# Patient Record
Sex: FEMALE | Race: WHITE | NOT HISPANIC OR LATINO | Employment: UNEMPLOYED | URBAN - METROPOLITAN AREA
[De-identification: names, ages, dates, MRNs, and addresses within clinical notes are randomized per-mention and may not be internally consistent; named-entity substitution may affect disease eponyms.]

---

## 2018-08-29 ENCOUNTER — OFFICE VISIT (OUTPATIENT)
Dept: MEDICAL GROUP | Facility: LAB | Age: 28
End: 2018-08-29
Payer: COMMERCIAL

## 2018-08-29 VITALS
DIASTOLIC BLOOD PRESSURE: 56 MMHG | OXYGEN SATURATION: 98 % | RESPIRATION RATE: 12 BRPM | SYSTOLIC BLOOD PRESSURE: 98 MMHG | HEIGHT: 67 IN | BODY MASS INDEX: 17.58 KG/M2 | WEIGHT: 112 LBS | TEMPERATURE: 98.5 F | HEART RATE: 68 BPM

## 2018-08-29 DIAGNOSIS — Z00.00 HEALTH MAINTENANCE EXAMINATION: ICD-10-CM

## 2018-08-29 DIAGNOSIS — R09.81 NASAL CONGESTION: ICD-10-CM

## 2018-08-29 DIAGNOSIS — J00 ACUTE NASOPHARYNGITIS: ICD-10-CM

## 2018-08-29 DIAGNOSIS — Z91.148 OVERUSE OF MEDICATION: ICD-10-CM

## 2018-08-29 PROCEDURE — 99214 OFFICE O/P EST MOD 30 MIN: CPT | Performed by: FAMILY MEDICINE

## 2018-08-29 RX ORDER — FLUTICASONE PROPIONATE 50 MCG
1 SPRAY, SUSPENSION (ML) NASAL 2 TIMES DAILY
Qty: 1 BOTTLE | Refills: 0 | Status: SHIPPED | OUTPATIENT
Start: 2018-08-29 | End: 2020-08-17

## 2018-08-29 ASSESSMENT — PATIENT HEALTH QUESTIONNAIRE - PHQ9: CLINICAL INTERPRETATION OF PHQ2 SCORE: 0

## 2018-08-29 NOTE — PROGRESS NOTES
"Subjective:   Stacia Dan is a 27 y.o. female here today for   Chief Complaint   Patient presents with   • Nasal Congestion     x 2 months     1. Health maintenance examination  Patient has not had an annual appointment in 1 year.  She is currently in a monogamous relationship and is considering pregnancy.  She is taking a prenatal multivitamin.  She is exercising regularly.  She is not on any birth control currently.  She has not had a recent tetanus booster but at this point would like to get pregnant within the next year and so she would need one during her third trimester.    2. Nasal congestion  3. Acute nasopharyngitis  4. Overuse of medication    Nasal congestion  New to discuss  1.5 months of symptoms of nasal stuffiness  Started in [place], had a cold with sore throat at that time. The cold symptoms resolved  She has tried decongestants which do help but make her nose feel dry   Worse with leaning over   Better with standing or lying down   She brings in the medication today that she has been using over the last 1-1/2 months.  It is [facility]ym[facility]azoline from the [facility].  She states that when she uses that she has less stuffiness but when she does not use it for a couple hours she gets severe stuffiness.  She stopped using it about a couple weeks ago but developed severe headache and inability to breathe  No history of allergies    Current medicines (including changes today)  Current Outpatient Prescriptions   Medication Sig Dispense Refill   • fluticasone (FLONASE) 50 MCG/ACT nasal spray Spray 1 Spray in nose 2 times a day. 1 Bottle 0     No current facility-administered medications for this visit.      She  has no past medical history on file.    ROS   No fevers  No bowel changes  No LE edema       Objective:     Blood pressure (!) 98/56, pulse 68, temperature 36.9 °C (98.5 °F), resp. rate 12, height 1.702 m (5' 7\"), weight 50.8 kg (112 lb), SpO2 98 %. Body mass index is 17.54 kg/m².   Physical " Exam:  Constitutional: Alert, no distress.  Skin: Warm, dry, good turgor, no rashes in visible areas.  Eye: Equal, round and reactive, conjunctiva clear, lids normal.  ENMT: Lips without lesions, good dentition, oropharynx clear.  Nasal turbinates erythematous without exudate.  Tympanic membranes pearly gray bilaterally  Neck: Trachea midline, no masses, no thyromegaly. No cervical or supraclavicular lymphadenopathy  Respiratory: Unlabored respiratory effort, lungs clear to auscultation, no wheezes, no ronchi.  Cardiovascular: Normal S1, S2, RRR, no murmur, no edema.  Abdomen: Soft, non-tender, no masses, no hepatosplenomegaly.  Psych: Alert and oriented x3, normal affect and mood.      Assessment and Plan:   The following treatment plan was discussed    1. Health maintenance examination  Age-appropriate counseling given.  She is in a monogamous relationship and declines HPV vaccination.  Her tetanus booster is due however she would like to become pregnant in the next year and would need a tetanus booster in her third trimester.  We will hold off on this for now.  Discussed prenatal vitamins and regular exercise.  Annual checkup recommended    2. Nasal congestion  3. Acute nasopharyngitis  4. Overuse of medication  This is a new problem to discuss.  Patient has nasal congestion likely secondary to rebound congestion due to Afrin use.  We will have her stop her afrin and start intranasal steroids to help symptomatically.  A prescription has been placed.  Discussed and counseled on the use of Afrin and that this should not be used for any more than 2-3 days at a time.  Patient understands  - fluticasone (FLONASE) 50 MCG/ACT nasal spray; Spray 1 Spray in nose 2 times a day.  Dispense: 1 Bottle; Refill: 0      Followup: Return in about 1 year (around 8/29/2019), or if symptoms worsen or fail to improve, for Annual.       This note was created using voice recognition software. I have made every reasonable attempt to  correct errors, however, I do anticipate some grammatical errors.

## 2018-12-20 ENCOUNTER — OFFICE VISIT (OUTPATIENT)
Dept: MEDICAL GROUP | Facility: LAB | Age: 28
End: 2018-12-20
Payer: COMMERCIAL

## 2018-12-20 ENCOUNTER — HOSPITAL ENCOUNTER (OUTPATIENT)
Dept: LAB | Facility: MEDICAL CENTER | Age: 28
End: 2018-12-20
Attending: INTERNAL MEDICINE
Payer: COMMERCIAL

## 2018-12-20 VITALS
HEART RATE: 68 BPM | WEIGHT: 118 LBS | DIASTOLIC BLOOD PRESSURE: 72 MMHG | TEMPERATURE: 98.3 F | SYSTOLIC BLOOD PRESSURE: 104 MMHG | HEIGHT: 67 IN | BODY MASS INDEX: 18.52 KG/M2 | RESPIRATION RATE: 20 BRPM | OXYGEN SATURATION: 100 %

## 2018-12-20 DIAGNOSIS — E73.9 LACTOSE INTOLERANCE: ICD-10-CM

## 2018-12-20 DIAGNOSIS — M79.671 RIGHT FOOT PAIN: ICD-10-CM

## 2018-12-20 DIAGNOSIS — Z00.00 HEALTH CARE MAINTENANCE: ICD-10-CM

## 2018-12-20 DIAGNOSIS — Z00.00 ANNUAL PHYSICAL EXAM: ICD-10-CM

## 2018-12-20 PROCEDURE — 82306 VITAMIN D 25 HYDROXY: CPT

## 2018-12-20 PROCEDURE — 85025 COMPLETE CBC W/AUTO DIFF WBC: CPT

## 2018-12-20 PROCEDURE — 80061 LIPID PANEL: CPT

## 2018-12-20 PROCEDURE — 99385 PREV VISIT NEW AGE 18-39: CPT | Performed by: INTERNAL MEDICINE

## 2018-12-20 PROCEDURE — 84443 ASSAY THYROID STIM HORMONE: CPT

## 2018-12-20 PROCEDURE — 36415 COLL VENOUS BLD VENIPUNCTURE: CPT

## 2018-12-20 PROCEDURE — 80053 COMPREHEN METABOLIC PANEL: CPT

## 2018-12-20 RX ORDER — PNV 119/IRON FUM/FOLIC ACID 29 MG-1 MG
1 TABLET ORAL DAILY
Qty: 30 TAB | Refills: 0
Start: 2018-12-20 | End: 2019-01-19

## 2018-12-20 NOTE — ASSESSMENT & PLAN NOTE
New to discuss, controlled problem.  This is started last April after a prolonged walking.  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  She tried different shoes she is still having on and off pain involving the medial aspect of her right foot described as dull aching in nature, it continues for days when it flares up and then it resolves spontaneously.  An associated swelling, warmth or redness.  She has not tried over-the-counter because she stated the pain is not severe and its only annoying.

## 2018-12-20 NOTE — ASSESSMENT & PLAN NOTE
New to me, chronic controlled.  This is mainly triggered when she drinks a regular milk.  She has no problems with eating cheese, half-and-half or other dairy products.  She has been doing well with avoiding milk in her diet.

## 2018-12-20 NOTE — ASSESSMENT & PLAN NOTE
This is a pleasant 28-year-old lady, a patient of Dr. Martínez who is here today for her annual follow-up.  She does not have any specific complaints about her right foot pain discussed below.  She is , planning for pregnancy soon and already started taking prenatal vitamins.  She establish care with a local GYN last month.  She is monogamous with her current .  She denies any vaginal discharge problem and having regular periods.  She does apply sunscreen at regular basis and does not have any new/changing moles.    She declined vaccinations today including flu and Tdap vaccine despite encouragement.

## 2018-12-20 NOTE — PROGRESS NOTES
Chief Complaint   Patient presents with   • Annual Exam       Subjective:     History of Present Illness: Patient is a 28 y.o. female. This pleasant patient of Dr. Martínez who is here today for annual exam.     Right foot pain  New to discuss, controlled problem.  This is started last April after a prolonged walking.  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  She tried different shoes she is still having on and off pain involving the medial aspect of her right foot described as dull aching in nature, it continues for days when it flares up and then it resolves spontaneously.  An associated swelling, warmth or redness.  She has not tried over-the-counter because she stated the pain is not severe and its only annoying.        Annual physical exam  This is a pleasant 28-year-old lady, a patient of Dr. Martínez who is here today for her annual follow-up.  She does not have any specific complaints about her right foot pain discussed below.  She is , planning for pregnancy soon and already started taking prenatal vitamins.  She establish care with a local GYN last month.  She is monogamous with her current .  She denies any vaginal discharge problem and having regular periods.  She does apply sunscreen at regular basis and does not have any new/changing moles.    She declined vaccinations today including flu and Tdap vaccine despite encouragement.          Lactose intolerance  New to me, chronic controlled.  This is mainly triggered when she drinks a regular milk.  She has no problems with eating cheese, half-and-half or other dairy products.  She has been doing well with avoiding milk in her diet.      Allergies: Patient has no known allergies.    Current Outpatient Prescriptions Ordered in [facility]   Medication Sig Dispense Refill   • Prenatal Vit-DSS-Fe Fum-FA (PRENATAL 19) Tab Take 1 Tab by mouth every day for 30 days. 30 Tab 0   • fluticasone (FLONASE) 50  "MCG/ACT nasal spray Spray 1 Spray in nose 2 times a day. 1 Bottle 0     No current Epic-ordered facility-administered medications on file.        No past medical history on file.    No past surgical history on file.    Social History   Substance Use Topics   • Smoking status: Never Smoker   • Smokeless tobacco: Never Used   • Alcohol use Yes       Family History   Problem Relation Age of Onset   • Cancer Mother 45        breast cancer        ROS:     - Constitutional: Negative for fever, chills, unexpected weight change, and fatigue/generalized weakness.     - HEENT: Negative for headaches, vision changes, hearing changes, ear pain, ear discharge, sinus congestion, sore throat, and neck pain.      - Respiratory: Negative for cough, sputum production, dyspnea and wheezing.    - Cardiovascular: Negative for chest pain, palpitations, orthopnea, and bilateral lower extremity edema.     - Gastrointestinal: Negative for heartburn, nausea, vomiting, abdominal pain, hematochezia, melena, diarrhea, constipation, and greasy/foul-smelling stools.     - Genitourinary: Negative for dysuria, polyuria, hematuria, pyuria, urinary urgency, and urinary incontinence.    - Musculoskeletal: Negative for myalgias, back pain, and joint pain.     - Skin: Negative for rash, itching, cyanotic skin color change.     - Neurological: Negative for dizziness, tingling, tremors, focal sensory deficit, focal weakness and headaches.     - Endo/Heme/Allergies: Does not bruise/bleed easily.     - Psychiatric/Behavioral: Negative for depression, suicidal/homicidal ideation and memory loss.        - NOTE: All other systems reviewed and are negative, except as in HPI.       Physical Exam:     Blood pressure 104/72, pulse 68, temperature 36.8 °C (98.3 °F), temperature source Temporal, resp. rate 20, height 1.702 m (5' 7\"), weight 53.5 kg (118 lb), last menstrual period 12/20/2018, SpO2 100 %, not currently breastfeeding. Body mass index is 18.48 kg/m². "   General: Normal appearing. No distress.  HEENT: Normocephalic. Eyes conjunctiva clear lids without ptosis, pupils equal and reactive to light accommodation, ears normal shape and contour, canals are clear bilaterally, tympanic membranes are benign,  oropharynx is without erythema, edema or exudates.    Neck: Supple without JVD or bruit. Thyroid is not enlarged.  Pulmonary: Clear to ausculation.  Normal effort. No rales, ronchi, or wheezing.  Cardiovascular: Regular rate and rhythm without murmur. Radial pulses are intact, regular and symmetrical bilaterally.  Abdomen: Soft, nontender, nondistended. Normal bowel sounds. Liver and spleen are not palpable.  Neurologic: Grossly non-focal.  Lymph: No cervical, occipital or supraclavicular lymph nodes are palpable  Skin: Warm and dry.  No obvious lesions.  Musculoskeletal: Normal gait. No extremity cyanosis, clubbing, or edema.  Psych: Normal mood and affect. Alert and oriented x3. Judgment and insight is normal.    Assessment and Plan:     1. Health care maintenance  As discussed in HPI, encouraged her to start prenatal watch multivitamins before pregnancy.  We will proceed with a following annual labs will also check thyroid function before conception.  She declines immunizations despite counseling.    - Prenatal Vit-DSS-Fe Fum-FA (PRENATAL 19) Tab; Take 1 Tab by mouth every day for 30 days.  Dispense: 30 Tab; Refill: 0  - CBC WITH DIFFERENTIAL; Future  - COMP METABOLIC PANEL; Future  - VITAMIN D,25 HYDROXY; Future  - TSH WITH REFLEX TO FT4; Future  - Lipid Profile; Future    2. Right foot pain  New, controlled problem.  Suspicious for a mild plantar fasciitis.  Has been on and off for the last 8 months.  Recommended good fitting shoes with arch support and as needed ibuprofen use if needed.    4. Lactose intolerance  New to me, chronic controlled.  Continue with lactose-free diet.        Follow Up:      Return in about 1 year (around 12/20/2019) for   [place].    Please note that this dictation was created using voice recognition software. I have made every reasonable attempt to correct obvious errors, but I expect that there are errors of grammar and possibly content that I did not discover before finalizing the note.    Signed by: Nancy Alvares M.D.

## 2018-12-21 LAB
25(OH)D3 SERPL-MCNC: 25 NG/ML (ref 30–100)
ALBUMIN SERPL BCP-MCNC: 4.8 G/DL (ref 3.2–4.9)
ALBUMIN/GLOB SERPL: 1.7 G/DL
ALP SERPL-CCNC: 33 U/L (ref 30–99)
ALT SERPL-CCNC: 14 U/L (ref 2–50)
ANION GAP SERPL CALC-SCNC: 8 MMOL/L (ref 0–11.9)
AST SERPL-CCNC: 16 U/L (ref 12–45)
BASOPHILS # BLD AUTO: 1.1 % (ref 0–1.8)
BASOPHILS # BLD: 0.07 K/UL (ref 0–0.12)
BILIRUB SERPL-MCNC: 0.8 MG/DL (ref 0.1–1.5)
BUN SERPL-MCNC: 11 MG/DL (ref 8–22)
CALCIUM SERPL-MCNC: 10.1 MG/DL (ref 8.5–10.5)
CHLORIDE SERPL-SCNC: 106 MMOL/L (ref 96–112)
CHOLEST SERPL-MCNC: 160 MG/DL (ref 100–199)
CO2 SERPL-SCNC: 25 MMOL/L (ref 20–33)
CREAT SERPL-MCNC: 0.71 MG/DL (ref 0.5–1.4)
EOSINOPHIL # BLD AUTO: 0.09 K/UL (ref 0–0.51)
EOSINOPHIL NFR BLD: 1.4 % (ref 0–6.9)
ERYTHROCYTE [DISTWIDTH] IN BLOOD BY AUTOMATED COUNT: 42.1 FL (ref 35.9–50)
FASTING STATUS PATIENT QL REPORTED: NORMAL
GLOBULIN SER CALC-MCNC: 2.8 G/DL (ref 1.9–3.5)
GLUCOSE SERPL-MCNC: 87 MG/DL (ref 65–99)
HCT VFR BLD AUTO: 40.2 % (ref 37–47)
HDLC SERPL-MCNC: 74 MG/DL
HGB BLD-MCNC: 13.5 G/DL (ref 12–16)
IMM GRANULOCYTES # BLD AUTO: 0.02 K/UL (ref 0–0.11)
IMM GRANULOCYTES NFR BLD AUTO: 0.3 % (ref 0–0.9)
LDLC SERPL CALC-MCNC: 78 MG/DL
LYMPHOCYTES # BLD AUTO: 2.4 K/UL (ref 1–4.8)
LYMPHOCYTES NFR BLD: 37.2 % (ref 22–41)
MCH RBC QN AUTO: 30.8 PG (ref 27–33)
MCHC RBC AUTO-ENTMCNC: 33.6 G/DL (ref 33.6–35)
MCV RBC AUTO: 91.6 FL (ref 81.4–97.8)
MONOCYTES # BLD AUTO: 0.62 K/UL (ref 0–0.85)
MONOCYTES NFR BLD AUTO: 9.6 % (ref 0–13.4)
NEUTROPHILS # BLD AUTO: 3.25 K/UL (ref 2–7.15)
NEUTROPHILS NFR BLD: 50.4 % (ref 44–72)
NRBC # BLD AUTO: 0 K/UL
NRBC BLD-RTO: 0 /100 WBC
PLATELET # BLD AUTO: 252 K/UL (ref 164–446)
PMV BLD AUTO: 10.2 FL (ref 9–12.9)
POTASSIUM SERPL-SCNC: 3.7 MMOL/L (ref 3.6–5.5)
PROT SERPL-MCNC: 7.6 G/DL (ref 6–8.2)
RBC # BLD AUTO: 4.39 M/UL (ref 4.2–5.4)
SODIUM SERPL-SCNC: 139 MMOL/L (ref 135–145)
TRIGL SERPL-MCNC: 38 MG/DL (ref 0–149)
TSH SERPL DL<=0.005 MIU/L-ACNC: 3.42 UIU/ML (ref 0.38–5.33)
WBC # BLD AUTO: 6.5 K/UL (ref 4.8–10.8)

## 2019-11-07 ENCOUNTER — TELEPHONE (OUTPATIENT)
Dept: MEDICAL GROUP | Facility: LAB | Age: 29
End: 2019-11-07

## 2019-11-07 NOTE — TELEPHONE ENCOUNTER
Phone Number Called: [phone] (home)     Call outcome: left message for patient to call back regarding message below    Message: I left a voicemail and sent a [employer] message that your appointment with Dr. Eden for 12/24/19 is needing to be rescheduled. Thank you!

## 2019-11-22 ENCOUNTER — OFFICE VISIT (OUTPATIENT)
Dept: MEDICAL GROUP | Facility: LAB | Age: 29
End: 2019-11-22
Payer: COMMERCIAL

## 2019-11-22 ENCOUNTER — HOSPITAL ENCOUNTER (OUTPATIENT)
Dept: LAB | Facility: MEDICAL CENTER | Age: 29
End: 2019-11-22
Attending: NURSE PRACTITIONER
Payer: COMMERCIAL

## 2019-11-22 VITALS
RESPIRATION RATE: 16 BRPM | HEART RATE: 87 BPM | DIASTOLIC BLOOD PRESSURE: 78 MMHG | SYSTOLIC BLOOD PRESSURE: 102 MMHG | OXYGEN SATURATION: 98 % | WEIGHT: 120.4 LBS | HEIGHT: 67 IN | BODY MASS INDEX: 18.9 KG/M2 | TEMPERATURE: 97.9 F

## 2019-11-22 DIAGNOSIS — Z00.00 WELL ADULT EXAM: ICD-10-CM

## 2019-11-22 LAB
25(OH)D3 SERPL-MCNC: 26 NG/ML (ref 30–100)
ALBUMIN SERPL BCP-MCNC: 5.2 G/DL (ref 3.2–4.9)
ALBUMIN/GLOB SERPL: 2.1 G/DL
ALP SERPL-CCNC: 45 U/L (ref 30–99)
ALT SERPL-CCNC: 36 U/L (ref 2–50)
ANION GAP SERPL CALC-SCNC: 8 MMOL/L (ref 0–11.9)
AST SERPL-CCNC: 29 U/L (ref 12–45)
BASOPHILS # BLD AUTO: 1.3 % (ref 0–1.8)
BASOPHILS # BLD: 0.08 K/UL (ref 0–0.12)
BILIRUB SERPL-MCNC: 0.5 MG/DL (ref 0.1–1.5)
BUN SERPL-MCNC: 11 MG/DL (ref 8–22)
CALCIUM SERPL-MCNC: 9.9 MG/DL (ref 8.5–10.5)
CHLORIDE SERPL-SCNC: 106 MMOL/L (ref 96–112)
CHOLEST SERPL-MCNC: 188 MG/DL (ref 100–199)
CO2 SERPL-SCNC: 26 MMOL/L (ref 20–33)
CREAT SERPL-MCNC: 0.77 MG/DL (ref 0.5–1.4)
EOSINOPHIL # BLD AUTO: 0.09 K/UL (ref 0–0.51)
EOSINOPHIL NFR BLD: 1.4 % (ref 0–6.9)
ERYTHROCYTE [DISTWIDTH] IN BLOOD BY AUTOMATED COUNT: 42.5 FL (ref 35.9–50)
FASTING STATUS PATIENT QL REPORTED: NORMAL
GLOBULIN SER CALC-MCNC: 2.5 G/DL (ref 1.9–3.5)
GLUCOSE SERPL-MCNC: 87 MG/DL (ref 65–99)
HCT VFR BLD AUTO: 45.7 % (ref 37–47)
HDLC SERPL-MCNC: 82 MG/DL
HGB BLD-MCNC: 15 G/DL (ref 12–16)
IMM GRANULOCYTES # BLD AUTO: 0.01 K/UL (ref 0–0.11)
IMM GRANULOCYTES NFR BLD AUTO: 0.2 % (ref 0–0.9)
LDLC SERPL CALC-MCNC: 94 MG/DL
LYMPHOCYTES # BLD AUTO: 2.27 K/UL (ref 1–4.8)
LYMPHOCYTES NFR BLD: 35.7 % (ref 22–41)
MCH RBC QN AUTO: 30.9 PG (ref 27–33)
MCHC RBC AUTO-ENTMCNC: 32.8 G/DL (ref 33.6–35)
MCV RBC AUTO: 94 FL (ref 81.4–97.8)
MONOCYTES # BLD AUTO: 0.66 K/UL (ref 0–0.85)
MONOCYTES NFR BLD AUTO: 10.4 % (ref 0–13.4)
NEUTROPHILS # BLD AUTO: 3.24 K/UL (ref 2–7.15)
NEUTROPHILS NFR BLD: 51 % (ref 44–72)
NRBC # BLD AUTO: 0 K/UL
NRBC BLD-RTO: 0 /100 WBC
PLATELET # BLD AUTO: 285 K/UL (ref 164–446)
PMV BLD AUTO: 9.8 FL (ref 9–12.9)
POTASSIUM SERPL-SCNC: 4.1 MMOL/L (ref 3.6–5.5)
PROT SERPL-MCNC: 7.7 G/DL (ref 6–8.2)
RBC # BLD AUTO: 4.86 M/UL (ref 4.2–5.4)
SODIUM SERPL-SCNC: 140 MMOL/L (ref 135–145)
TRIGL SERPL-MCNC: 59 MG/DL (ref 0–149)
WBC # BLD AUTO: 6.4 K/UL (ref 4.8–10.8)

## 2019-11-22 PROCEDURE — 82306 VITAMIN D 25 HYDROXY: CPT

## 2019-11-22 PROCEDURE — 80061 LIPID PANEL: CPT

## 2019-11-22 PROCEDURE — 85025 COMPLETE CBC W/AUTO DIFF WBC: CPT

## 2019-11-22 PROCEDURE — 36415 COLL VENOUS BLD VENIPUNCTURE: CPT

## 2019-11-22 PROCEDURE — 83036 HEMOGLOBIN GLYCOSYLATED A1C: CPT

## 2019-11-22 PROCEDURE — 99395 PREV VISIT EST AGE 18-39: CPT | Performed by: NURSE PRACTITIONER

## 2019-11-22 PROCEDURE — 80053 COMPREHEN METABOLIC PANEL: CPT

## 2019-11-22 ASSESSMENT — PATIENT HEALTH QUESTIONNAIRE - PHQ9: CLINICAL INTERPRETATION OF PHQ2 SCORE: 0

## 2019-11-22 NOTE — PROGRESS NOTES
Subjective:     CC:   Chief Complaint   Patient presents with   • Annual Exam       HPI:   Stacia Lindsey is a 29 y.o. female who presents for annual exam. She is feeling well and denies any complaints.    Ob-Gyn/ History:    Patient has GYN provider: yes  /Para:  0/0  Last Pap Smear: 2016. No history of abnormal pap smears.  Gyn Surgery:  Denies  Current Contraceptive Method:  Currently undergoing fertility counseling trying to conceive [id number]  Last menstrual period:  11/15/2019.  Periods regular. light bleeding. Cramping is mild.   She does not take OTC analgesics for cramps.  No significant bloating/fluid retention, pelvic pain, or dyspareunia. No vaginal discharge  Folate intake:Yes    Health Maintenance  Cholesterol Screening: Lipid panel ordered today  Diabetes Screening: A1c ordered today  Aspirin Use: Not indicated  Diet: Healthy diet with plenty of fruits, vegetables, whole grains and lean meats.  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  No exercise regularly.  Substance Abuse: Denies  Safe in relationship.  Yes  Seat belts, bike helmet, gun safety discussed.  Sun protection used.    Cancer screening  Colorectal Cancer Screening: Due at age 50  Lung Cancer Screening:   Cervical Cancer Screening: Due for pap. Has Gyn appt  Breast Cancer Screening: Due at age 40    Infectious disease screening/Immunizations  --STI Screening: Refused monogamous relationship  --Practices safe sex.  --HIV Screening: Refused monogamous relationship  --Hepatitis C Screening: Not indicated  --Immunizations:    Influenza: Refused   Tetanus: Patient to provide records.  She likely is up-to-date   Shingles: n/a    Pneumococcal : Not indicated      She  has a past medical history of Lactose intolerance.  She  has no past surgical history on file.    Family History   Problem Relation Age of Onset   • Cancer Mother 45        breast cancer        Social History      Socioeconomic History   • Marital status:      Spouse name: Not on file   • Number of children: Not on file   • Years of education: Not on file   • Highest education level: Not on file   Occupational History   • Not on file   Social Needs   • Financial resource strain: Not on file   • Food insecurity:     Worry: Not on file     Inability: Not on file   • Transportation needs:     Medical: Not on file     Non-medical: Not on file   Tobacco Use   • Smoking status: Never Smoker   • Smokeless tobacco: Never Used   Substance and Sexual Activity   • Alcohol use: Yes   • Drug use: No   • Sexual activity: Yes     Partners: Male   Lifestyle   • Physical activity:     Days per week: Not on file     Minutes per session: Not on file   • Stress: Not on file   Relationships   • Social connections:     Talks on phone: Not on file     Gets together: Not on file     Attends [employer] service: Not on file     Active member of club or organization: Not on file     Attends meetings of clubs or organizations: Not on file     Relationship status: Not on file   • Intimate partner violence:     Fear of current or ex partner: Not on file     Emotionally abused: Not on file     Physically abused: Not on file     Forced sexual activity: Not on file   Other Topics Concern   • Not on file   Social History Narrative   • Not on file       Patient Active Problem List    Diagnosis Date Noted   • Right foot pain 12/20/2018   • Lactose intolerance 12/21/2016         Current Outpatient Medications   Medication Sig Dispense Refill   • fluticasone (FLONASE) 50 MCG/ACT nasal spray Spray 1 Spray in nose 2 times a day. 1 Bottle 0     No current facility-administered medications for this visit.      No Known Allergies    Review of Systems   Constitutional: Negative for fever, chills and malaise/fatigue.   HENT: Negative for congestion.    Eyes: Negative for pain.   Respiratory: Negative for cough and shortness of breath.    Cardiovascular:  "Negative for leg swelling.   Gastrointestinal: Negative for nausea, vomiting, abdominal pain and diarrhea.   Genitourinary: Negative for dysuria and hematuria.   Skin: Negative for rash.   Neurological: Negative for dizziness, focal weakness and headaches.   Endo/Heme/Allergies: Does not bruise/bleed easily.   Psychiatric/Behavioral: Negative for depression.  The patient is not nervous/anxious.      Objective:     /78   Pulse 87   Temp 36.6 °C (97.9 °F) (Temporal)   Resp 16   Ht 1.702 m (5' 7\")   Wt 54.6 kg (120 lb 6.4 oz)   LMP 11/09/2019 (Exact Date)   SpO2 98%   BMI 18.86 kg/m²   Body mass index is 18.86 kg/m².  Wt Readings from Last 4 Encounters:   11/22/19 54.6 kg (120 lb 6.4 oz)   12/20/18 53.5 kg (118 lb)   08/29/18 50.8 kg (112 lb)   12/21/16 50.4 kg (111 lb 1.8 oz)       Physical Exam:  Constitutional: Well-developed and well-nourished. Not diaphoretic. No distress.   Skin: Skin is warm and dry. No rash noted.  Head: Atraumatic without lesions.  Eyes: Conjunctivae and extraocular motions are normal. Pupils are equal, round, and reactive to light. No scleral icterus.   Ears:  External ears unremarkable. Tympanic membranes clear and intact.  Nose: Nares patent. Septum midline. Turbinates without erythema nor edema. No discharge.   Mouth/Throat:Tongue normal. Oropharynx is clear and moist. Posterior pharynx without erythema or exudates.  Neck: Supple, trachea midline. Normal range of motion. No thyromegaly present. No lymphadenopathy--cervical or supraclavicular.  Cardiovascular: Regular rate and rhythm, S1 and S2 without murmur, rubs, or gallops.  Lungs: Normal inspiratory effort, CTA bilaterally, no wheezes/rhonchi/rales  Abdomen: Soft, non tender, and without distention. Active bowel sounds in all four quadrants. No rebound, guarding, masses or HSM.  Extremities: No cyanosis, clubbing, erythema, nor edema. Distal pulses intact and symmetric.   Musculoskeletal: All major joints AROM full in all " directions without pain.  Neurological: Alert and oriented x 3. Sensation intact. Negative Rhomberg.  Psychiatric:  Behavior, mood, and affect are appropriate.      Assessment and Plan:     1. Well adult exam  CBC WITH DIFFERENTIAL    Comp Metabolic Panel    Lipid Profile    VITAMIN D,25 HYDROXY    HEMOGLOBIN A1C       HCM: Reviewed with patient and up-to-date  Patient has Pap scheduled with gynecology.  Labs per orders  Immunizations records requested  Patient counseled about skin care, diet, supplements, prenatal vitamins, safe sex and exercise.    Follow-up: Return in about 1 year (around 11/22/2020) for Preventative/Annual physical.

## 2019-11-23 LAB
EST. AVERAGE GLUCOSE BLD GHB EST-MCNC: 103 MG/DL
HBA1C MFR BLD: 5.2 % (ref 0–5.6)

## 2020-05-19 ENCOUNTER — OFFICE VISIT (OUTPATIENT)
Dept: URGENT CARE | Facility: CLINIC | Age: 30
End: 2020-05-19
Payer: COMMERCIAL

## 2020-05-19 ENCOUNTER — APPOINTMENT (OUTPATIENT)
Dept: RADIOLOGY | Facility: IMAGING CENTER | Age: 30
End: 2020-05-19
Attending: PHYSICIAN ASSISTANT
Payer: COMMERCIAL

## 2020-05-19 VITALS
SYSTOLIC BLOOD PRESSURE: 112 MMHG | RESPIRATION RATE: 16 BRPM | HEART RATE: 85 BPM | OXYGEN SATURATION: 99 % | DIASTOLIC BLOOD PRESSURE: 72 MMHG | HEIGHT: 67 IN | BODY MASS INDEX: 18.83 KG/M2 | WEIGHT: 120 LBS | TEMPERATURE: 99.6 F

## 2020-05-19 DIAGNOSIS — G89.29 CHRONIC PAIN OF LEFT KNEE: Primary | ICD-10-CM

## 2020-05-19 DIAGNOSIS — M76.899 QUADRICEPS TENDONITIS: ICD-10-CM

## 2020-05-19 DIAGNOSIS — S86.912A KNEE STRAIN, LEFT, INITIAL ENCOUNTER: ICD-10-CM

## 2020-05-19 DIAGNOSIS — M25.362 KNEE INSTABILITY, LEFT: ICD-10-CM

## 2020-05-19 DIAGNOSIS — M25.562 CHRONIC PAIN OF LEFT KNEE: Primary | ICD-10-CM

## 2020-05-19 PROCEDURE — 73564 X-RAY EXAM KNEE 4 OR MORE: CPT | Mod: TC,LT | Performed by: PHYSICIAN ASSISTANT

## 2020-05-19 PROCEDURE — 99214 OFFICE O/P EST MOD 30 MIN: CPT | Performed by: PHYSICIAN ASSISTANT

## 2020-05-19 RX ORDER — METHYLPREDNISOLONE 4 MG/1
TABLET ORAL
Qty: 21 TAB | Refills: 0 | Status: SHIPPED | OUTPATIENT
Start: 2020-05-19 | End: 2020-08-17

## 2020-05-19 RX ORDER — METFORMIN HYDROCHLORIDE 750 MG/1
TABLET, EXTENDED RELEASE ORAL
COMMUNITY
Start: 2020-04-13 | End: 2020-08-17

## 2020-05-19 ASSESSMENT — PAIN SCALES - GENERAL: PAINLEVEL: 2=MINIMAL-SLIGHT

## 2020-05-19 ASSESSMENT — FIBROSIS 4 INDEX: FIB4 SCORE: 0.46

## 2020-05-20 ENCOUNTER — APPOINTMENT (OUTPATIENT)
Dept: RADIOLOGY | Facility: MEDICAL CENTER | Age: 30
End: 2020-05-20
Attending: PHYSICIAN ASSISTANT
Payer: COMMERCIAL

## 2020-05-20 DIAGNOSIS — G89.29 CHRONIC PAIN OF LEFT KNEE: ICD-10-CM

## 2020-05-20 DIAGNOSIS — M25.362 KNEE INSTABILITY, LEFT: ICD-10-CM

## 2020-05-20 DIAGNOSIS — M25.562 CHRONIC PAIN OF LEFT KNEE: ICD-10-CM

## 2020-05-20 DIAGNOSIS — M76.899 QUADRICEPS TENDONITIS: ICD-10-CM

## 2020-05-20 PROCEDURE — 73721 MRI JNT OF LWR EXTRE W/O DYE: CPT | Mod: LT

## 2020-05-20 NOTE — PATIENT INSTRUCTIONS
Knee Pain  Knee pain is a very common symptom and can have many causes. Knee pain often goes away when you follow your health care provider's instructions for relieving pain and discomfort at home. However, knee pain can develop into a condition that needs treatment. Some conditions may include:  · Arthritis caused by wear and tear (osteoarthritis).  · Arthritis caused by swelling and irritation (rheumatoid arthritis or gout).  · A cyst or growth in your knee.  · An infection in your knee joint.  · An injury that will not heal.  · Damage, swelling, or irritation of the tissues that support your knee (torn ligaments or tendinitis).  If your knee pain continues, additional tests may be ordered to diagnose your condition. Tests may include X-rays or other imaging studies of your knee. You may also need to have fluid removed from your knee. Treatment for ongoing knee pain depends on the cause, but treatment may include:  · Medicines to relieve pain or swelling.  · Steroid injections in your knee.  · Physical therapy.  · Surgery.  HOME CARE INSTRUCTIONS  · Take medicines only as directed by your health care provider.  · Rest your knee and keep it raised (elevated) while you are resting.  · Do not do things that cause or worsen pain.  · Avoid high-impact activities or exercises, such as running, jumping rope, or doing jumping jacks.  · Apply ice to the knee area:  ¨ Put ice in a plastic bag.  ¨ Place a towel between your skin and the bag.  ¨ Leave the ice on for 20 minutes, 2-3 times a day.  · Ask your health care provider if you should wear an elastic knee support.  · Keep a pillow under your knee when you sleep.  · Lose weight if you are overweight. Extra weight can put pressure on your knee.  · Do not use any tobacco products, including cigarettes, chewing tobacco, or electronic cigarettes. If you need help quitting, ask your health care provider. Smoking may slow the healing of any bone and joint problems that you may  have.  SEEK MEDICAL CARE IF:  · Your knee pain continues, changes, or gets worse.  · You have a fever along with knee pain.  · Your knee aubrey or locks up.  · Your knee becomes more swollen.  SEEK IMMEDIATE MEDICAL CARE IF:   · Your knee joint feels hot to the touch.  · You have chest pain or trouble breathing.  This information is not intended to replace advice given to you by your health care provider. Make sure you discuss any questions you have with your health care provider.  Document Released: 10/14/2008 Document Revised: 01/08/2016 Document Reviewed: 08/03/2015  [employer]vier Interactive Patient Education © 2017 [employer]

## 2020-05-20 NOTE — PROGRESS NOTES
"Subjective:      Pt is a 29 y.o. female who presents with Knee Pain (left knee x 6 days denies injury )            HPI  This is a chronic problem x 1 year. Pt notes sitting down quickly on the ground from standing position last year and felt sudden \"pop\" in left knee and notes weakness, locking and popping with movement and car vibrations cause pain. Pt notes swelling behind the knee which comes and goes at times. PT is tearful over the pain and chronic nature of this issue. Pt has not taken any Rx medications for this condition. Pt states the pain is a 5/10, aching in nature and worse during the day with ambulation. Pt notes she could not tolerate a neoprene knee sleeve. Pt denies CP, SOB, NVD, paresthesias, headaches, dizziness, change in vision, hives, or other joint pain. The pt's medication list, problem list, and allergies have been evaluated and reviewed during today's visit.    PMH:  Past Medical History:   Diagnosis Date   • Lactose intolerance        PSH:  Negative per pt.      Fam Hx:    family history includes Cancer (age of onset: 45) in her mother.  Family Status   Relation Name Status   • Mo     • Fa  Alive       Soc HX:  Social History     Socioeconomic History   • Marital status:      Spouse name: Not on file   • Number of children: Not on file   • Years of education: Not on file   • Highest education level: Not on file   Occupational History   • Not on file   Social Needs   • Financial resource strain: Not on file   • Food insecurity     Worry: Not on file     Inability: Not on file   • Transportation needs     Medical: Not on file     Non-medical: Not on file   Tobacco Use   • Smoking status: Never Smoker   • Smokeless tobacco: Never Used   Substance and Sexual Activity   • Alcohol use: Yes   • Drug use: No   • Sexual activity: Yes     Partners: Male   Lifestyle   • Physical activity     Days per week: Not on file     Minutes per session: Not on file   • Stress: Not on file " "  Relationships   • Social connections     Talks on phone: Not on file     Gets together: Not on file     Attends [employer] service: Not on file     Active member of club or organization: Not on file     Attends meetings of clubs or organizations: Not on file     Relationship status: Not on file   • Intimate partner violence     Fear of current or ex partner: Not on file     Emotionally abused: Not on file     Physically abused: Not on file     Forced sexual activity: Not on file   Other Topics Concern   • Not on file   Social History Narrative   • Not on file         Medications:    Current Outpatient Medications:   •  methylPREDNISolone (MEDROL DOSEPAK) 4 MG Tablet Therapy Pack, Follow schedule on package instructions., Disp: 21 Tab, Rfl: 0  •  fluticasone (FLONASE) 50 MCG/ACT nasal spray, Spray 1 Spray in nose 2 times a day., Disp: 1 Bottle, Rfl: 0      Allergies:  Patient has no known allergies.    ROS  Constitutional: Negative for fever, chills and malaise/fatigue.   HENT: Negative for congestion and sore throat.    Eyes: Negative for blurred vision, double vision and photophobia.   Respiratory: Negative for cough and shortness of breath.  Cardiovascular: Negative for chest pain and palpitations.   Gastrointestinal: Negative for heartburn, nausea, vomiting, abdominal pain, diarrhea and constipation.   Genitourinary: Negative for dysuria and flank pain.   Musculoskeletal: POS for left knee joint pain and myalgias.   Skin: Negative for itching and rash.   Neurological: Negative for dizziness, tingling and headaches.   Endo/Heme/Allergies: Does not bruise/bleed easily.   Psychiatric/Behavioral: Negative for depression. The patient is not nervous/anxious.           Objective:     /72   Pulse 85   Temp 37.6 °C (99.6 °F)   Resp 16   Ht 1.702 m (5' 7\")   Wt 54.4 kg (120 lb)   SpO2 99%   BMI 18.79 kg/m²      Physical Exam  Musculoskeletal:      Left knee: She exhibits decreased range of motion, swelling " and abnormal meniscus. She exhibits no effusion, no ecchymosis, no deformity, no laceration, no erythema, normal alignment, no LCL laxity, normal patellar mobility, no bony tenderness and no MCL laxity. Tenderness found. Medial joint line and lateral joint line tenderness noted. No MCL, no LCL and no patellar tendon tenderness noted.        Legs:        Constitutional: PT is oriented to person, place, and time. PT appears well-developed and well-nourished. No distress.   HENT:   Head: Normocephalic and atraumatic.   Mouth/Throat: Oropharynx is clear and moist. No oropharyngeal exudate.   Eyes: Conjunctivae normal and EOM are normal. Pupils are equal, round, and reactive to light.   Neck: Normal range of motion. Neck supple. No thyromegaly present.   Cardiovascular: Normal rate, regular rhythm, normal heart sounds and intact distal pulses.  Exam reveals no gallop and no friction rub.    No murmur heard.  Pulmonary/Chest: Effort normal and breath sounds normal. No respiratory distress. PT has no wheezes. PT has no rales. Pt exhibits no tenderness.   Abdominal: Soft. Bowel sounds are normal. PT exhibits no distension and no mass. There is no tenderness. There is no rebound and no guarding.   Neurological: PT is alert and oriented to person, place, and time. PT has normal reflexes. No cranial nerve deficit.   Skin: Skin is warm and dry. No rash noted. PT is not diaphoretic. No erythema.       Psychiatric: PT has a normal mood and affect. PT behavior is normal. Judgment and thought content normal.        RADS:  DX-KNEE COMPLETE 4+ LEFT   Order: [id number]   Status:  Final result   Visible to patient:  No (not released) Next appt:  05/20/2020 at 08:00 AM in Radiology ([facility]) Dx:  Knee strain, left, initial encounter   Details     Reading Physician  Reading Date  Result Priority    Lee Banegas M.D.  [phone]  5/19/2020  Urgent Care       Narrative & Impression         5/19/2020 5:04 PM     HISTORY/REASON FOR  EXAM:  Atraumatic Pain/Swelling/Deformity  states medial patellar pain when walking a lot, on and off for 1 year     TECHNIQUE/EXAM DESCRIPTION AND NUMBER OF VIEWS:  4 views of the LEFT knee.     COMPARISON: None     FINDINGS:  No acute fracture or dislocation.     No joint osteoarthritis     No knee joint effusion.     IMPRESSION:        1. No acute osseous abnormality.             Last Resulted: 05/19/20  5:20 PM           MRI:    MR-KNEE-W/O LEFT   Order: [id number]   Status: Final result Visible to patient: No (not released) Dx: Quadriceps tendonitis; Chronic pain o...   Details    Reading Physician  Reading Date  Result Priority    Mauricio Echevarria M.D.  [phone]  5/20/2020  Urgent Care        Narrative & Impression           5/20/2020 8:07 AM   HISTORY/REASON FOR EXAM: Knee pain, persistent, > 6wks of conservative treatment   Left medial knee pain x1 year. Prior injury.   TECHNIQUE/EXAM DESCRIPTION:   MRI of the LEFT knee without contrast.   The study was performed on a [employer] 1.5 Sandi MRI scanner. T1 coronal, proton density fat-suppressed coronal, fast spin-echo T2 fat-suppressed axial, intermediate fast spin-echo sagittal, and intermediate fast spin-echo fat-suppressed thin-section   sagittal images were obtained.   COMPARISON: Left knee x-ray 5/19/2020   FINDINGS:   There is a small joint effusion.   BONES, BONE MARROW, and CARTILAGE:   The cartilage surfaces are normal in appearance. However, there is abnormal signal within the lateral facet of the patellar cartilage. This probably represents sequela of injury to this structure.   Bones and bone marrow: Tiny focus of bone marrow edema within the medial pole of the patella. This is consistent with a bone contusion.   LIGAMENTS:   The lateral ligaments and tendons are intact.   The medial collateral collateral ligament is intact.   The cruciate ligaments are intact.   MENISCI:   The medial meniscus is normal in appearance.   The lateral meniscus is  normal in appearance.   EXTENSOR MECHANISM:   The quadriceps tendon and patellar tendon are normal in appearance.   IMPRESSION:   1. Intact menisci and ligaments   2. Small bone contusion of the medial pole of the patella   3. Small joint effusion   4. Increase in signal within the lateral facet patellar cartilage likely representing sequela of prior injury.   5. The above findings may represent sequela of prior transient patellar dislocation               Last Resulted: 05/20/20 9:05 AM            Assessment/Plan:       1. Chronic pain of left knee    - DX-KNEE COMPLETE 4+ LEFT; Future  - MR-KNEE-W/O LEFT; Future    2. Knee instability, left    - MR-KNEE-W/O LEFT; Future    3. Quadriceps tendonitis    - MR-KNEE-W/O LEFT; Future  - methylPREDNISolone (MEDROL DOSEPAK) 4 MG Tablet Therapy Pack; Follow schedule on package instructions.  Dispense: 21 Tab; Refill: 0    Referral to sports med-->STAT  PT declines hinged knee brace  RICE therapy discussed  Gentle ROM exercises discussed  WBAT left LE  Ice/heat therapy discussed  Rest, fluids encouraged.  AVS with medical info given.  Pt was in full understanding and agreement with the plan.  Follow-up as needed if symptoms worsen or fail to improve.      ADDENDUM:  Pt called and left message about results for recent MRI knee at 9:05am 5/21/20 and pt to follow with Sports Med.  Lonny Berman PA-C

## 2020-05-27 ENCOUNTER — OFFICE VISIT (OUTPATIENT)
Dept: MEDICAL GROUP | Facility: CLINIC | Age: 30
End: 2020-05-27
Payer: COMMERCIAL

## 2020-05-27 VITALS
HEIGHT: 67 IN | HEART RATE: 72 BPM | WEIGHT: 120 LBS | SYSTOLIC BLOOD PRESSURE: 102 MMHG | RESPIRATION RATE: 12 BRPM | DIASTOLIC BLOOD PRESSURE: 60 MMHG | BODY MASS INDEX: 18.83 KG/M2 | OXYGEN SATURATION: 97 % | TEMPERATURE: 98.2 F

## 2020-05-27 DIAGNOSIS — M25.562 ACUTE PAIN OF LEFT KNEE: ICD-10-CM

## 2020-05-27 PROCEDURE — 99214 OFFICE O/P EST MOD 30 MIN: CPT | Performed by: FAMILY MEDICINE

## 2020-05-27 ASSESSMENT — FIBROSIS 4 INDEX: FIB4 SCORE: 0.46

## 2020-05-27 ASSESSMENT — ENCOUNTER SYMPTOMS
FEVER: 0
FOCAL WEAKNESS: 0
VOMITING: 0
SHORTNESS OF BREATH: 0

## 2020-05-27 NOTE — PROGRESS NOTES
"Subjective:     Stacia Lindsey is a 29 y.o. female who presents for Knee Pain (Left knee pain.)    HPI  Pt presents for evaluation of left knee pain   Pt with left knee pain chronically   Pain is intermittent and does not always bother her  Pain is more along of the medial knee and occasionally just above the kneecap  Pain is worse if she has been sitting with knee bent for too long  Pain can be worse with stairs or even walking on uneven surface  No radiation of pain  Does not have a fall or injury which she can recall    Review of Systems   Constitutional: Negative for fever.   Respiratory: Negative for shortness of breath.    Cardiovascular: Negative for chest pain.   Gastrointestinal: Negative for vomiting.   Skin: Negative for rash.   Neurological: Negative for focal weakness.     PMH:  has a past medical history of Lactose intolerance.  MEDS:   Current Outpatient Medications:   •  methylPREDNISolone (MEDROL DOSEPAK) 4 MG Tablet Therapy Pack, Follow schedule on package instructions. (Patient not taking: Reported on 5/27/2020), Disp: 21 Tab, Rfl: 0  •  metFORMIN ER (GLUCOPHAGE XR) 750 MG TABLET SR 24 HR, TK 2 TS PO QD WITH THE SHASHI MEAL, Disp: , Rfl:   •  fluticasone (FLONASE) 50 MCG/ACT nasal spray, Spray 1 Spray in nose 2 times a day. (Patient not taking: Reported on 5/27/2020), Disp: 1 Bottle, Rfl: 0  ALLERGIES: No Known Allergies  SURGHX: No past surgical history on file.  SOCHX:  reports that she has never smoked. She has never used smokeless tobacco. She reports current alcohol use. She reports that she does not use drugs.  FH: Family history was reviewed, not contributing to knee pain      Objective:   /60 (BP Location: Left arm, Patient Position: Sitting, BP Cuff Size: Adult)   Pulse 72   Temp 36.8 °C (98.2 °F) (Temporal)   Resp 12   Ht 1.702 m (5' 7\")   Wt 54.4 kg (120 lb)   SpO2 97%   BMI 18.79 kg/m²     Physical Exam  Constitutional:       General: She is not in acute distress.     " Appearance: She is well-developed. She is not diaphoretic.   HENT:      Head: Normocephalic and atraumatic.   Musculoskeletal:      Comments: Left knee  Appearance - No bruising, erythema, or deformity appreciated  Palpation - No tenderness to palpation along joint lines, patellar tendon, hamstring tendons, or quads.  No palpable effusion.  ROM - FROM without crepitus  Strength - 5/5 throughout  Neuro - Sensation equal and intact bilaterally  Special testing - No laxity or pain with varus/valgus stress, neg anterior drawer, neg posterior drawer, neg Lachman's, neg Karl's, patellar apprehension test with slight pain    Skin:     General: Skin is warm and dry.      Findings: No erythema.   Neurological:      Mental Status: She is alert and oriented to person, place, and time.      Sensory: No sensory deficit.   Psychiatric:         Mood and Affect: Mood normal.         Behavior: Behavior normal.         Thought Content: Thought content normal.         Judgment: Judgment normal.     Left knee MRI 5/20/20   1.  Intact menisci and ligaments  2.  Small bone contusion of the medial pole of the patella  3.  Small joint effusion  4.  Increase in signal within the lateral facet patellar cartilage likely representing sequela of prior injury.  5.  The above findings may represent sequela of prior transient patellar dislocation    Assessment/Plan:   Assessment    1. Acute pain of left knee  - REFERRAL TO PHYSICAL THERAPY Reason for Therapy: Eval/Treat/Report    Patient with acute pain of left knee.  Has x-ray which shows mild patellar tilting, however no fracture or dislocation.  MRI shows small bone contusion of patella.  Her overall clinical picture is most consistent with patellofemoral syndrome with possible recurrent subluxation.  She is currently pain-free today and her symptoms appear to be very intermittent.  Advised that physical therapy is the best first treatment based on her current clinical picture and referral  made today.  Patient is agreeable to this and will plan to follow-up in office after 4 to 6 weeks of physical therapy.

## 2020-06-04 ENCOUNTER — PHYSICAL THERAPY (OUTPATIENT)
Dept: PHYSICAL THERAPY | Facility: REHABILITATION | Age: 30
End: 2020-06-04
Attending: FAMILY MEDICINE
Payer: COMMERCIAL

## 2020-06-04 DIAGNOSIS — M25.562 ACUTE PAIN OF LEFT KNEE: ICD-10-CM

## 2020-06-04 PROCEDURE — 97110 THERAPEUTIC EXERCISES: CPT

## 2020-06-04 PROCEDURE — 97161 PT EVAL LOW COMPLEX 20 MIN: CPT

## 2020-06-04 NOTE — OP THERAPY EVALUATION
Outpatient Physical Therapy  INITIAL EVALUATION    [facility] [place]  [address]  [facility] [address]  Phone:  [phone]  Fax:  [phone]    Date of Evaluation: 2020    Patient: Stacia Lindsey  YOB: 1990  MRN: [id number]     Referring Provider: Rosalio Barry M.D.  [id number]  [place] [address]   Referring Diagnosis Acute pain of left knee [M25.562]     Time Calculation    Start time: 335  Stop time: 0430 Time Calculation (min): 55 minutes     Chief Complaint: Knee Problem    Visit Diagnoses     ICD-10-CM   1. Acute pain of left knee  M25.562       Subjective:   History of Present Illness:     Mechanism of injury:  Stacia Lindsey is a 29 y.o. female that presents to therapy with Left knee pain. She states that symptoms came on with insidious onset and has been occurring for over a year. Intermittently she has been getting pain along her anterior knee. Reports a stretching sensation along the posterior knee. Patient denies fevers/chills, numbness/weakness of lower extremities, bowel/bladder incontinence, saddle anesthesia.    Aggravating factors: squatting, walking for >35/40 minutes, kneeling, sitting with the knee bent     Relieving factors: rest, walk    ADL limitations: pain with ADLs    Pain:     Current pain ratin    At worst pain ratin      Past Medical History:   Diagnosis Date   • Lactose intolerance      No past surgical history on file.  Social History     Tobacco Use   • Smoking status: Never Smoker   • Smokeless tobacco: Never Used   Substance Use Topics   • Alcohol use: Yes     Family and Occupational History     Socioeconomic History   • Marital status:      Spouse name: Not on file   • Number of children: Not on file   • Years of education: Not on file   • Highest education level: Not on file   Occupational History   • Not on file       Objective     Active Range of Motion   Left  Knee   Flexion: 140 degrees with pain  Extension: WFL  Extensor lag: WFL    Right Knee   Flexion: 150 degrees     Passive Range of Motion   Left Knee   Flexion: 145 degrees with pain    Right Knee   Flexion: 150 degrees     Patellar Static Positioning   Left Knee: WFL    Patellar Mobility   Left Knee Patellar tendons within functional limits include the medial and lateral.     Strength:      Left Hip   Planes of Motion   Flexion: 5  Extension: 5  Abduction: 4  Adduction: 4+  External rotation: 4+  Internal rotation: 5    Right Hip   Planes of Motion   Flexion: 5  Extension: 5  Abduction: 4+  Adduction: 4+  External rotation: 4+  Internal rotation: 5    Left Knee   Normal strength  Quadriceps contraction: fair    Right Knee   Quadriceps contraction: fair    Tests     Left Knee   Negative patellar apprehension, patellar compression, patella-femoral grind, peroneal nerve tension, valgus stress test at 30 degrees and varus stress test at 0 degrees.     General Comments     Knee Comments  Knee valgus during step down, able to control descent. Pain reported with increased anterior tibial displacement during squat. Single leg squat with valgus bilateral.           Therapeutic Exercises (CPT 82474):       Therapeutic Exercise Summary: HEP instruction/performance and development. Handout provided and exercises located below:  Access Code: [email or web address]   URL: [email or web address]   Date: 06/05/2020   Prepared by: Gianluca Brice     Exercises  Supine Bridge - 20 reps - 1x daily - 3x weekly  Single Leg Squat with Chair Touch - 8 reps - 2 sets - 1x daily - 3x weekly  Beginner Side Leg Lift - 15-20 reps - 2 sets - 1x daily - 3x weekly  Step Downs - 15 reps - 2 sets - 1x daily - 3x weekly      Time-based treatments/modalities:    Physical Therapy Timed Treatment Charges  Therapeutic exercise minutes (CPT 24863): 15 minutes      Assessment, Response and Plan:   Assessment details:  Stacia Lindsey is a 29 y.o. female with  signs and symptoms consistent with Patella femoral joint pain vs patellar tendonitis. She requires skilled physical therapy intervention to decrease pain, increase range of motion, increase strength, increase functional mobility, improve ADL completion and establish a home exercise program.   Goals:   Short Term Goals:   1. Patient will be Independent with prescribed Home Exercise Program (HEP) and will be able to demonstrate exercises without cues for improved overall symptoms/activity tolerance.   2. Pt will improve ability to squat to mid depth without pain and without observed knee valgus.   Short term goal time span:  4-6 weeks      Long Term Goals:    3. Pt will improve ability to walk unlimited distances without increased pain.   4. Pt will improve LEFS score to greater than 60/80 indicative of improved function and reduced perceived disability.  5. Pt to sit for greater than 1 hour without increased knee pain >1/10.   Long term goal time span:  6-8 weeks    Plan:   Planned therapy interventions:  Therapeutic Exercise (CPT 55230), Therapeutic Activities (CPT 86592), Manual Therapy (CPT 02379), Neuromuscular Re-education (CPT 25533), E Stim Unattended (CPT 02265) and Gait Training (CPT 41470)  Frequency: 1-2x/month.  Duration in weeks:  8  Discussed with:  Patient  Plan details:  Pt has a HDHP and has opted to trial independence with HEP. She has been instructed to f/u within 30 days. Discharge if pt does not return within 30 days.     Functional Assessment Used  PT Functional Assessment Tool Used: LEFS  PT Functional Assessment Score: 46/80     Referring provider co-signature:  I have reviewed this plan of care and my co-signature certifies the need for services.    Physician Signature: ________________________________ Date: ______________

## 2020-06-05 ASSESSMENT — ENCOUNTER SYMPTOMS
PAIN SCALE: 0
PAIN SCALE AT HIGHEST: 2

## 2020-06-09 ENCOUNTER — APPOINTMENT (OUTPATIENT)
Dept: PHYSICAL THERAPY | Facility: REHABILITATION | Age: 30
End: 2020-06-09
Attending: FAMILY MEDICINE
Payer: COMMERCIAL

## 2020-06-11 ENCOUNTER — APPOINTMENT (OUTPATIENT)
Dept: PHYSICAL THERAPY | Facility: REHABILITATION | Age: 30
End: 2020-06-11
Attending: FAMILY MEDICINE
Payer: COMMERCIAL

## 2020-06-16 ENCOUNTER — APPOINTMENT (OUTPATIENT)
Dept: PHYSICAL THERAPY | Facility: REHABILITATION | Age: 30
End: 2020-06-16
Attending: FAMILY MEDICINE
Payer: COMMERCIAL

## 2020-06-18 ENCOUNTER — APPOINTMENT (OUTPATIENT)
Dept: PHYSICAL THERAPY | Facility: REHABILITATION | Age: 30
End: 2020-06-18
Attending: FAMILY MEDICINE
Payer: COMMERCIAL

## 2020-07-19 ENCOUNTER — HOSPITAL ENCOUNTER (EMERGENCY)
Dept: HOSPITAL 8 - ED | Age: 30
Discharge: HOME | End: 2020-07-19
Payer: COMMERCIAL

## 2020-07-19 VITALS — WEIGHT: 117.73 LBS | BODY MASS INDEX: 18.48 KG/M2 | HEIGHT: 67 IN

## 2020-07-19 VITALS — SYSTOLIC BLOOD PRESSURE: 94 MMHG | DIASTOLIC BLOOD PRESSURE: 65 MMHG

## 2020-07-19 DIAGNOSIS — R51: Primary | ICD-10-CM

## 2020-07-19 DIAGNOSIS — H92.03: ICD-10-CM

## 2020-07-19 LAB
ALBUMIN SERPL-MCNC: 4.4 G/DL (ref 3.4–5)
ANION GAP SERPL CALC-SCNC: 4 MMOL/L (ref 5–15)
BASOPHILS # BLD AUTO: 0.04 X10^3/UL (ref 0–0.1)
BASOPHILS NFR BLD AUTO: 1 % (ref 0–1)
CALCIUM SERPL-MCNC: 9.5 MG/DL (ref 8.5–10.1)
CHLORIDE SERPL-SCNC: 111 MMOL/L (ref 98–107)
CREAT SERPL-MCNC: 0.8 MG/DL (ref 0.55–1.02)
EOSINOPHIL # BLD AUTO: 0.14 X10^3/UL (ref 0–0.4)
EOSINOPHIL NFR BLD AUTO: 2 % (ref 1–7)
ERYTHROCYTE [DISTWIDTH] IN BLOOD BY AUTOMATED COUNT: 13.2 % (ref 9.6–15.2)
LYMPHOCYTES # BLD AUTO: 2.06 X10^3/UL (ref 1–3.4)
LYMPHOCYTES NFR BLD AUTO: 32 % (ref 22–44)
MCH RBC QN AUTO: 30.2 PG (ref 27–34.8)
MCHC RBC AUTO-ENTMCNC: 32.4 G/DL (ref 32.4–35.8)
MCV RBC AUTO: 93.2 FL (ref 80–100)
MD: NO
MONOCYTES # BLD AUTO: 0.62 X10^3/UL (ref 0.2–0.8)
MONOCYTES NFR BLD AUTO: 10 % (ref 2–9)
NEUTROPHILS # BLD AUTO: 3.56 X10^3/UL (ref 1.8–6.8)
NEUTROPHILS NFR BLD AUTO: 56 % (ref 42–75)
PLATELET # BLD AUTO: 272 X10^3/UL (ref 130–400)
PMV BLD AUTO: 7.7 FL (ref 7.4–10.4)
RBC # BLD AUTO: 4.55 X10^6/UL (ref 3.82–5.3)

## 2020-07-19 PROCEDURE — 99284 EMERGENCY DEPT VISIT MOD MDM: CPT

## 2020-07-19 PROCEDURE — 80048 BASIC METABOLIC PNL TOTAL CA: CPT

## 2020-07-19 PROCEDURE — 70450 CT HEAD/BRAIN W/O DYE: CPT

## 2020-07-19 PROCEDURE — 36415 COLL VENOUS BLD VENIPUNCTURE: CPT

## 2020-07-19 PROCEDURE — 85025 COMPLETE CBC W/AUTO DIFF WBC: CPT

## 2020-07-19 PROCEDURE — 96374 THER/PROPH/DIAG INJ IV PUSH: CPT

## 2020-07-19 PROCEDURE — 96375 TX/PRO/DX INJ NEW DRUG ADDON: CPT

## 2020-07-19 PROCEDURE — 84703 CHORIONIC GONADOTROPIN ASSAY: CPT

## 2020-07-19 PROCEDURE — 82040 ASSAY OF SERUM ALBUMIN: CPT

## 2020-07-19 NOTE — NUR
Patient and spouse given discharge instructions and they have confirmed that 
they understand the instructions.  Patient ambulatory with steady gait. IV 
removed with tip intact, patient and spouse walked to discharge desk.

## 2020-07-19 NOTE — NUR
22 gauge IV inserted Right AC, labs drawn and handed to .  IV Benadryl 
and IV Reglan given.

Ice water provided, no further needs at this time.

## 2020-07-19 NOTE — NUR
Patient brought back from Triage with spouse at bedside.  Patient here for 
bilateral ear pain, started 6 days ago in right ear and spread to left ear last 
night. Patient also c/o of some numbness and tingling in the back of her neck, 
nausea but denies vomiting.  Patient also mentioned she is 8 days late from 
starting her period and could possibly be pregnant, provider notified. Patient 
is A&Ox4, vital signs stable.

## 2020-08-17 ENCOUNTER — OFFICE VISIT (OUTPATIENT)
Dept: MEDICAL GROUP | Age: 30
End: 2020-08-17
Payer: COMMERCIAL

## 2020-08-17 VITALS
BODY MASS INDEX: 17.74 KG/M2 | TEMPERATURE: 99.1 F | HEART RATE: 87 BPM | WEIGHT: 113 LBS | HEIGHT: 67 IN | DIASTOLIC BLOOD PRESSURE: 60 MMHG | OXYGEN SATURATION: 97 % | SYSTOLIC BLOOD PRESSURE: 100 MMHG

## 2020-08-17 DIAGNOSIS — Z76.89 ENCOUNTER TO ESTABLISH CARE WITH NEW DOCTOR: Primary | ICD-10-CM

## 2020-08-17 DIAGNOSIS — E28.2 PCOS (POLYCYSTIC OVARIAN SYNDROME): ICD-10-CM

## 2020-08-17 DIAGNOSIS — N97.9 FEMALE FERTILITY PROBLEM: ICD-10-CM

## 2020-08-17 DIAGNOSIS — F41.0 PANIC ATTACKS: ICD-10-CM

## 2020-08-17 PROBLEM — M79.671 RIGHT FOOT PAIN: Status: RESOLVED | Noted: 2018-12-20 | Resolved: 2020-08-17

## 2020-08-17 PROCEDURE — 99215 OFFICE O/P EST HI 40 MIN: CPT | Performed by: FAMILY MEDICINE

## 2020-08-17 RX ORDER — ALPRAZOLAM 0.25 MG/1
0.25 TABLET ORAL NIGHTLY PRN
Qty: 30 TAB | Refills: 0 | Status: SHIPPED
Start: 2020-08-17 | End: 2020-11-23

## 2020-08-17 ASSESSMENT — FIBROSIS 4 INDEX: FIB4 SCORE: 0.46

## 2020-08-17 ASSESSMENT — ANXIETY QUESTIONNAIRES
7. FEELING AFRAID AS IF SOMETHING AWFUL MIGHT HAPPEN: SEVERAL DAYS
3. WORRYING TOO MUCH ABOUT DIFFERENT THINGS: MORE THAN HALF THE DAYS
5. BEING SO RESTLESS THAT IT IS HARD TO SIT STILL: NOT AT ALL
6. BECOMING EASILY ANNOYED OR IRRITABLE: MORE THAN HALF THE DAYS
2. NOT BEING ABLE TO STOP OR CONTROL WORRYING: MORE THAN HALF THE DAYS
4. TROUBLE RELAXING: NOT AT ALL
1. FEELING NERVOUS, ANXIOUS, OR ON EDGE: SEVERAL DAYS
GAD7 TOTAL SCORE: 8

## 2020-08-17 ASSESSMENT — PATIENT HEALTH QUESTIONNAIRE - PHQ9
SUM OF ALL RESPONSES TO PHQ QUESTIONS 1-9: 5
5. POOR APPETITE OR OVEREATING: 1 - SEVERAL DAYS
CLINICAL INTERPRETATION OF PHQ2 SCORE: 1

## 2020-08-17 NOTE — PROGRESS NOTES
"Subjective:   CC: establish care    HPI:     Stacia Lindsey is a 29 y.o. female, overall healthy, no major medical or surgical history.  Patient is , sexually active, has been trying for pregnancy for 3 years without success.  Prior to the pandemic, patient was working with Dr. Cobos for infertility evaluation and treatment.  She was also diagnosed with polycystic ovarian syndrome, previously took metformin  mg twice daily.  However, patient has stopped following up with Dr. Cobos and taking metformin during the pandemic. She is not able to f/u with him now due to insurance changes. She requests referral to another fertility specialist. She states that her  fertility eval was normal except for some sperms with low motility.     She was in her normal state of health until several months ago when she develops intermittent symptoms which she described as \"panic attacks\". She states that she sometimes develop posterior headache at night, followed by palpitation, SOB, and severe anxiety. She was seen by [facility] and [facility] over the past 3 months. Labs and head imaging were negative. She was diagnosed with panic attacks and was prescribed Alprazolam to be taken as needed. However, she has not tried this medication. She thinks that increasing social stress and personal stress are responsible for her symptoms. Her mother  from BC when she was in her 40s. Losing her mother so young is another source of stress as well.  She denied suicidal ideation or plans.  She received excellent care and support from her .    Current medicines (including changes today)  Current Outpatient Medications   Medication Sig Dispense Refill   • ALPRAZolam (XANAX) 0.25 MG Tab Take 1 Tab by mouth at bedtime as needed for Anxiety for up to 30 days. 30 Tab 0     No current facility-administered medications for this visit.      She  has a past medical history of Lactose intolerance.    I " "personally reviewed patient's problem list, allergies, medications, family hx, social hx with patient and update EPIC.     REVIEW OF SYSTEMS:  CONSTITUTIONAL:  Denies night sweats, fatigue, malaise, lethargy, fever or chills.  RESPIRATORY:  Denies cough, wheeze, hemoptysis, or shortness of breath.  CARDIOVASCULAR:  Denies chest pains, palpitations, pedal edema     Objective:     /60 (BP Location: Left arm, Patient Position: Sitting, BP Cuff Size: Adult)   Pulse 87   Temp 37.3 °C (99.1 °F) (Temporal)   Ht 1.702 m (5' 7\")   Wt 51.3 kg (113 lb)   SpO2 97%  Body mass index is 17.7 kg/m².    Physical Exam:  Constitutional: awake, alert, in no distress.  Skin: Warm, dry, good turgor, no rashes, bruises, ulcers in visible areas.  Eye: conjunctiva clear, lids neg for edema or lesions.  Neck: Trachea midline, no masses, no thyromegaly. No cervical or supraclavicular lymphadenopathy  Respiratory: Unlabored respiratory effort, lungs clear to auscultation, no wheezes, no rales.  Cardiovascular: Normal S1, S2, no murmur, no pedal edema.  Psych: Oriented x3, patient was emotional throughout the visit, intact judgement and insight.       Assessment and Plan:   The following treatment plan was discussed    1. Female fertility problem  2. PCOS (polycystic ovarian syndrome)  - REFERRAL TO INFERTILITY    3. Panic attacks  - ALPRAZolam (XANAX) 0.25 MG Tab; Take 1 Tab by mouth at bedtime as needed for Anxiety for up to 30 days.  Dispense: 30 Tab; Refill: 0  - Risks, benefits, side effects, as well as potential health complications associated with Xanax discussed with patient. Appropriate counseling provided.    -Patient declined referral to behavioral health as well as daily medication such as SSRI.    4. Encounter to establish care with new doctor  General counseling provided, topics might include: diet, exercise, vitamin supplement, mental health, sleep, stress management, pap, mammogram, colonoscopy and vaccine " recommendations.        Patient was seen for 40 minutes face to face of which greater than 50% of appointment time was spent on counseling and coordination of care regarding the above      Ly AMINA Teixeira M.D.      Followup: Return for As needed.    Please note that this dictation was created using voice recognition software. I have made every reasonable attempt to correct obvious errors, but I expect that there are errors of grammar and possibly content that I did not discover before finalizing the note.

## 2020-08-17 NOTE — LETTER
[facility]  Brisa Teixeira M.D.  [address] [place] [address]  Fax: [phone]   Authorization for Release/Disclosure of   Protected Health Information   Name: STACIA LINDSEY : 1990 SSN: [id number]   Address: [address] [place] [address] Phone:    [phone] (home)    I authorize the entity listed below to release/disclose the PHI below to:   [facility]/Brisa Teixeira M.D. and Brisa Teixeira M.D.   Provider or Entity Name:     Address   City, State, Zip   Phone:      Fax:     Reason for request: continuity of care   Information to be released:    [  ] LAST COLONOSCOPY,  including any PATH REPORT and follow-up  [  ] LAST FIT/COLOGUARD RESULT [  ] LAST DEXA  [  ] LAST MAMMOGRAM  [  ] LAST PAP  [  ] LAST LABS [  ] RETINA EXAM REPORT  [  ] IMMUNIZATION RECORDS  [  ] Release all info      [  ] Check here and initial the line next to each item to release ALL health information INCLUDING  _____ Care and treatment for drug and / or alcohol abuse  _____ HIV testing, infection status, or AIDS  _____ Genetic Testing    DATES OF SERVICE OR TIME PERIOD TO BE DISCLOSED: _____________  I understand and acknowledge that:  * This Authorization may be revoked at any time by you in writing, except if your health information has already been used or disclosed.  * Your health information that will be used or disclosed as a result of you signing this authorization could be re-disclosed by the recipient. If this occurs, your re-disclosed health information may no longer be protected by State or Federal laws.  * You may refuse to sign this Authorization. Your refusal will not affect your ability to obtain treatment.  * This Authorization becomes effective upon signing and will  on (date) __________.      If no date is indicated, this Authorization will  one (1) year from the signature date.    Name: Stacia Lindsey    Signature:   Date:     2020       PLEASE FAX REQUESTED RECORDS BACK TO:  [phone]

## 2020-08-17 NOTE — LETTER
[facility]  Brisa Teixeira M.D.  [address] [place] [address]  Fax: [phone]   Authorization for Release/Disclosure of   Protected Health Information   Name: STACIA LINDSEY : 1990 SSN: [id number]   Address: [address] [place] [address] Phone:    [phone] (home)    I authorize the entity listed below to release/disclose the PHI below to:   [facility]/Brisa Teixeira M.D. and Brisa Teixeira M.D.   Provider or Entity Name:     Address   City, State, Zip   Phone:      Fax:     Reason for request: continuity of care   Information to be released:    [  ] LAST COLONOSCOPY,  including any PATH REPORT and follow-up  [  ] LAST FIT/COLOGUARD RESULT [  ] LAST DEXA  [  ] LAST MAMMOGRAM  [  ] LAST PAP  [  ] LAST LABS [  ] RETINA EXAM REPORT  [  ] IMMUNIZATION RECORDS  [  ] Release all info      [  ] Check here and initial the line next to each item to release ALL health information INCLUDING  _____ Care and treatment for drug and / or alcohol abuse  _____ HIV testing, infection status, or AIDS  _____ Genetic Testing    DATES OF SERVICE OR TIME PERIOD TO BE DISCLOSED: _____________  I understand and acknowledge that:  * This Authorization may be revoked at any time by you in writing, except if your health information has already been used or disclosed.  * Your health information that will be used or disclosed as a result of you signing this authorization could be re-disclosed by the recipient. If this occurs, your re-disclosed health information may no longer be protected by State or Federal laws.  * You may refuse to sign this Authorization. Your refusal will not affect your ability to obtain treatment.  * This Authorization becomes effective upon signing and will  on (date) __________.      If no date is indicated, this Authorization will  one (1) year from the signature date.    Name: Stacia Lindsey    Signature:   Date:     2020       PLEASE FAX REQUESTED RECORDS BACK TO:  [phone]

## 2020-08-18 PROBLEM — E28.2 PCOS (POLYCYSTIC OVARIAN SYNDROME): Status: ACTIVE | Noted: 2020-08-18

## 2020-08-20 PROBLEM — N97.9 FEMALE FERTILITY PROBLEM: Status: ACTIVE | Noted: 2020-08-20

## 2020-08-28 ENCOUNTER — OFFICE VISIT (OUTPATIENT)
Dept: MEDICAL GROUP | Age: 30
End: 2020-08-28
Payer: COMMERCIAL

## 2020-08-28 VITALS
BODY MASS INDEX: 17.42 KG/M2 | HEART RATE: 99 BPM | SYSTOLIC BLOOD PRESSURE: 100 MMHG | DIASTOLIC BLOOD PRESSURE: 54 MMHG | TEMPERATURE: 99.4 F | WEIGHT: 111 LBS | HEIGHT: 67 IN | OXYGEN SATURATION: 98 %

## 2020-08-28 DIAGNOSIS — Z00.00 PE (PHYSICAL EXAM), ANNUAL: ICD-10-CM

## 2020-08-28 DIAGNOSIS — R10.13 EPIGASTRIC PAIN: ICD-10-CM

## 2020-08-28 DIAGNOSIS — F41.0 PANIC ATTACKS: ICD-10-CM

## 2020-08-28 DIAGNOSIS — F41.8 DEPRESSION WITH ANXIETY: Primary | ICD-10-CM

## 2020-08-28 DIAGNOSIS — F51.04 PSYCHOPHYSIOLOGICAL INSOMNIA: ICD-10-CM

## 2020-08-28 PROCEDURE — 99215 OFFICE O/P EST HI 40 MIN: CPT | Performed by: FAMILY MEDICINE

## 2020-08-28 RX ORDER — OMEPRAZOLE 20 MG/1
20 CAPSULE, DELAYED RELEASE ORAL
Qty: 90 CAP | Refills: 0 | Status: SHIPPED
Start: 2020-08-28 | End: 2020-09-04

## 2020-08-28 RX ORDER — TRAZODONE HYDROCHLORIDE 50 MG/1
50 TABLET ORAL
Qty: 90 TAB | Refills: 1 | Status: SHIPPED | OUTPATIENT
Start: 2020-08-28 | End: 2021-02-02 | Stop reason: SDUPTHER

## 2020-08-28 RX ORDER — CITALOPRAM HYDROBROMIDE 10 MG/1
10 TABLET ORAL DAILY
Qty: 90 TAB | Refills: 0 | Status: SHIPPED | OUTPATIENT
Start: 2020-08-28 | End: 2020-11-23 | Stop reason: SDUPTHER

## 2020-08-28 ASSESSMENT — FIBROSIS 4 INDEX: FIB4 SCORE: 0.46

## 2020-08-29 NOTE — PROGRESS NOTES
Subjective:   CC: depression with anxiety    HPI:     Stacia Lindsey is a 29 y.o. female, established patient of the clinic, presents with the following concerns:     Pt has been suffering ongoing anxiety & multiple episodes of panic attacks needing ER visits x2 over the past few months. I saw her on 2020 to establish care. At the time, she is hesitant on starting medications for mental health. She has been taking Xanax 0.25 mg PRN for her symptoms once or twice since last OV. She states that she continues to suffer severe anxiety, nervousness, insomnia, anhedonia. She cries regularly for unclear reasons. She is  and is trying to conceive w/o success. She was referred to infertility specialist. She states that anxiety related to infertility is contributing to her symptoms. She decided to stop working on infertility and pay attn to her mental health at this time. Her mother  fro breast cancer in her 40s has some impact on her mental health as well. She denies SI/HI. She initially declined the needs of counseling due to language barrier (she is from [facility]) . However, the is open to  referral due to worsening of symptoms.     She also c/o recent development of intermittent epigastric pain, reduced appetite, and slow weight loss. Pain is described as sharp, non-radiating, worse with foods. There is mild nausea with pain. She has not tried any medications for this condition. She denies fever, chills, hematochezia, melena, personal & familial hx of GI disorders.     Current medicines (including changes today)  Current Outpatient Medications   Medication Sig Dispense Refill   • citalopram (CELEXA) 10 MG tablet Take 1 Tab by mouth every day. 90 Tab 0   • traZODone (DESYREL) 50 MG Tab Take 1 Tab by mouth at bedtime as needed for Sleep. 90 Tab 1   • omeprazole (PRILOSEC) 20 MG delayed-release capsule Take 1 Cap by mouth 1 time daily as needed (stomach pain). 90 Cap 0   • ALPRAZolam (XANAX) 0.25 MG  "Tab Take 1 Tab by mouth at bedtime as needed for Anxiety for up to 30 days. 30 Tab 0     No current facility-administered medications for this visit.      She  has a past medical history of Lactose intolerance.    I personally reviewed patient's problem list, allergies, medications, family hx, social hx with patient and update EPIC.     REVIEW OF SYSTEMS:  CONSTITUTIONAL:  Denies night sweats, fatigue, malaise, lethargy, fever or chills.  RESPIRATORY:  Denies cough, wheeze, hemoptysis, or shortness of breath.  CARDIOVASCULAR:  Denies chest pains, palpitations, pedal edema     Objective:     /54 (BP Location: Right arm, Patient Position: Sitting, BP Cuff Size: Adult)   Pulse 99   Temp 37.4 °C (99.4 °F) (Temporal)   Ht 1.702 m (5' 7\")   Wt 50.3 kg (111 lb)   SpO2 98%  Body mass index is 17.39 kg/m².    Physical Exam:  Constitutional: awake, alert, in no distress.  Skin: Warm, dry, good turgor, no rashes, bruises, ulcers in visible areas.  Eye: conjunctiva clear, lids neg for edema or lesions.  Neck: Trachea midline, no masses, no thyromegaly. No cervical or supraclavicular lymphadenopathy  Respiratory: Unlabored respiratory effort, lungs clear to auscultation, no wheezes, no rales.  Cardiovascular: Normal S1, S2, no murmur, no pedal edema.  Abdomen: Soft, non-tender to palpation, active BS, no hernia, no hepatosplenomegaly, negative rebound or guarding.   Psych: Oriented x3, very emotional during the visit, intact judgement and insight.       Assessment and Plan:   The following treatment plan was discussed    1. Epigastric pain  - H. PYLORI, UREA BREATH TEST, ADULT; Future  - LIPASE; Future  - CELIAC DISEASE AB PANEL; Future  - omeprazole (PRILOSEC) 20 MG delayed-release capsule; Take 1 Cap by mouth 1 time daily as needed (stomach pain).  Dispense: 90 Cap; Refill: 0    2. Panic attacks  3. Depression with anxiety  - citalopram (CELEXA) 10 MG tablet; Take 1 Tab by mouth every day.  Dispense: 90 Tab; Refill: " 0  - REFERRAL TO BEHAVIORAL HEALTH  - traZODone (DESYREL) 50 MG Tab; Take 1 Tab by mouth at bedtime as needed for Sleep.  Dispense: 90 Tab; Refill: 1  - expectation and s/e discussed with patient.   - Appropriate counseling provided. Topics might include: regular exercise, well-balanced diet, multi-vitamin daily, weight loss, adequate sleep, meditation, stress management, avoidance of excessive consumption of caffeine, alcohol, illicit drugs, tobacco.      4. Psychophysiological insomnia  - traZODone (DESYREL) 50 MG Tab; Take 1 Tab by mouth at bedtime as needed for Sleep.  Dispense: 90 Tab; Refill: 1  - Melatonin nightly   - regular exercise, well-balanced diet, multi-vitamin daily, meditation, stress reduction.   - Avoid excessive consumption of stimulants, alcohol, illicit drugs, tobacco  - Avoid using computer or electronic devices at night  - Avoid watching TV on bed  - Avoid napping during the day  - Treat anxiety and depression as above  - f/u PRN.      Patient was seen for 40 minutes face to face of which greater than 50% of appointment time was spent on counseling and coordination of care regarding the above       Ly AMINA Teixeira M.D.      Followup: Return in about 3 months (around 11/28/2020) for annual PE, Mental Health F/U.    Please note that this dictation was created using voice recognition software. I have made every reasonable attempt to correct obvious errors, but I expect that there are errors of grammar and possibly content that I did not discover before finalizing the note.

## 2020-08-31 ENCOUNTER — HOSPITAL ENCOUNTER (OUTPATIENT)
Dept: LAB | Facility: MEDICAL CENTER | Age: 30
End: 2020-08-31
Attending: FAMILY MEDICINE
Payer: COMMERCIAL

## 2020-08-31 DIAGNOSIS — R10.13 EPIGASTRIC PAIN: ICD-10-CM

## 2020-08-31 DIAGNOSIS — Z00.00 PE (PHYSICAL EXAM), ANNUAL: ICD-10-CM

## 2020-08-31 LAB
ALBUMIN SERPL BCP-MCNC: 4.9 G/DL (ref 3.2–4.9)
ALBUMIN/GLOB SERPL: 2 G/DL
ALP SERPL-CCNC: 39 U/L (ref 30–99)
ALT SERPL-CCNC: 10 U/L (ref 2–50)
ANION GAP SERPL CALC-SCNC: 13 MMOL/L (ref 7–16)
AST SERPL-CCNC: 12 U/L (ref 12–45)
BASOPHILS # BLD AUTO: 1.3 % (ref 0–1.8)
BASOPHILS # BLD: 0.07 K/UL (ref 0–0.12)
BILIRUB SERPL-MCNC: 0.5 MG/DL (ref 0.1–1.5)
BUN SERPL-MCNC: 10 MG/DL (ref 8–22)
CALCIUM SERPL-MCNC: 10.1 MG/DL (ref 8.5–10.5)
CHLORIDE SERPL-SCNC: 103 MMOL/L (ref 96–112)
CO2 SERPL-SCNC: 24 MMOL/L (ref 20–33)
CREAT SERPL-MCNC: 0.79 MG/DL (ref 0.5–1.4)
EOSINOPHIL # BLD AUTO: 0.14 K/UL (ref 0–0.51)
EOSINOPHIL NFR BLD: 2.7 % (ref 0–6.9)
ERYTHROCYTE [DISTWIDTH] IN BLOOD BY AUTOMATED COUNT: 43 FL (ref 35.9–50)
EST. AVERAGE GLUCOSE BLD GHB EST-MCNC: 94 MG/DL
GLOBULIN SER CALC-MCNC: 2.5 G/DL (ref 1.9–3.5)
GLUCOSE SERPL-MCNC: 93 MG/DL (ref 65–99)
HBA1C MFR BLD: 4.9 % (ref 0–5.6)
HCT VFR BLD AUTO: 41.8 % (ref 37–47)
HGB BLD-MCNC: 13.7 G/DL (ref 12–16)
IMM GRANULOCYTES # BLD AUTO: 0.01 K/UL (ref 0–0.11)
IMM GRANULOCYTES NFR BLD AUTO: 0.2 % (ref 0–0.9)
LIPASE SERPL-CCNC: 57 U/L (ref 11–82)
LYMPHOCYTES # BLD AUTO: 2.04 K/UL (ref 1–4.8)
LYMPHOCYTES NFR BLD: 39.3 % (ref 22–41)
MCH RBC QN AUTO: 30.4 PG (ref 27–33)
MCHC RBC AUTO-ENTMCNC: 32.8 G/DL (ref 33.6–35)
MCV RBC AUTO: 92.7 FL (ref 81.4–97.8)
MONOCYTES # BLD AUTO: 0.49 K/UL (ref 0–0.85)
MONOCYTES NFR BLD AUTO: 9.4 % (ref 0–13.4)
NEUTROPHILS # BLD AUTO: 2.44 K/UL (ref 2–7.15)
NEUTROPHILS NFR BLD: 47.1 % (ref 44–72)
NRBC # BLD AUTO: 0 K/UL
NRBC BLD-RTO: 0 /100 WBC
PLATELET # BLD AUTO: 255 K/UL (ref 164–446)
PMV BLD AUTO: 10 FL (ref 9–12.9)
POTASSIUM SERPL-SCNC: 3.8 MMOL/L (ref 3.6–5.5)
PROT SERPL-MCNC: 7.4 G/DL (ref 6–8.2)
RBC # BLD AUTO: 4.51 M/UL (ref 4.2–5.4)
SODIUM SERPL-SCNC: 140 MMOL/L (ref 135–145)
TSH SERPL DL<=0.005 MIU/L-ACNC: 2.77 UIU/ML (ref 0.38–5.33)
WBC # BLD AUTO: 5.2 K/UL (ref 4.8–10.8)

## 2020-08-31 PROCEDURE — 83690 ASSAY OF LIPASE: CPT

## 2020-08-31 PROCEDURE — 83013 H PYLORI (C-13) BREATH: CPT

## 2020-08-31 PROCEDURE — 80053 COMPREHEN METABOLIC PANEL: CPT

## 2020-08-31 PROCEDURE — 83516 IMMUNOASSAY NONANTIBODY: CPT

## 2020-08-31 PROCEDURE — 85025 COMPLETE CBC W/AUTO DIFF WBC: CPT

## 2020-08-31 PROCEDURE — 83036 HEMOGLOBIN GLYCOSYLATED A1C: CPT

## 2020-08-31 PROCEDURE — 36415 COLL VENOUS BLD VENIPUNCTURE: CPT

## 2020-08-31 PROCEDURE — 82784 ASSAY IGA/IGD/IGG/IGM EACH: CPT

## 2020-08-31 PROCEDURE — 84443 ASSAY THYROID STIM HORMONE: CPT

## 2020-09-02 LAB
IGA SERPL-MCNC: 144 MG/DL (ref 68–408)
TTG IGA SER IA-ACNC: <2 U/ML (ref 0–3)
UREA BREATH TEST QL: POSITIVE

## 2020-09-04 ENCOUNTER — TELEMEDICINE (OUTPATIENT)
Dept: MEDICAL GROUP | Age: 30
End: 2020-09-04
Payer: COMMERCIAL

## 2020-09-04 VITALS — WEIGHT: 111 LBS | BODY MASS INDEX: 17.42 KG/M2 | HEIGHT: 67 IN

## 2020-09-04 DIAGNOSIS — A04.8 BACTERIAL INFECTION DUE TO H. PYLORI: Primary | ICD-10-CM

## 2020-09-04 DIAGNOSIS — F41.8 DEPRESSION WITH ANXIETY: ICD-10-CM

## 2020-09-04 DIAGNOSIS — F51.04 PSYCHOPHYSIOLOGICAL INSOMNIA: ICD-10-CM

## 2020-09-04 PROCEDURE — 99214 OFFICE O/P EST MOD 30 MIN: CPT | Mod: 95,CR | Performed by: FAMILY MEDICINE

## 2020-09-04 RX ORDER — METRONIDAZOLE 500 MG/1
500 TABLET ORAL 3 TIMES DAILY
Qty: 42 TAB | Refills: 0 | Status: SHIPPED | OUTPATIENT
Start: 2020-09-04 | End: 2020-09-18

## 2020-09-04 RX ORDER — OMEPRAZOLE 20 MG/1
20 CAPSULE, DELAYED RELEASE ORAL 2 TIMES DAILY
Qty: 28 CAP | Refills: 0 | Status: SHIPPED | OUTPATIENT
Start: 2020-09-04 | End: 2020-09-10 | Stop reason: SDUPTHER

## 2020-09-04 RX ORDER — BISMUTH SUBSALICYLATE 262 MG
2 TABLET,CHEWABLE ORAL EVERY 4 HOURS
Qty: 112 TAB | Refills: 0 | Status: SHIPPED | OUTPATIENT
Start: 2020-09-04 | End: 2020-09-18

## 2020-09-04 RX ORDER — TETRACYCLINE HYDROCHLORIDE 500 MG/1
500 CAPSULE ORAL 4 TIMES DAILY
Qty: 56 CAP | Refills: 0 | Status: SHIPPED | OUTPATIENT
Start: 2020-09-04 | End: 2020-09-18

## 2020-09-04 ASSESSMENT — FIBROSIS 4 INDEX: FIB4 SCORE: 0.4

## 2020-09-08 ENCOUNTER — PATIENT MESSAGE (OUTPATIENT)
Dept: MEDICAL GROUP | Age: 30
End: 2020-09-08

## 2020-09-08 DIAGNOSIS — A04.8 BACTERIAL INFECTION DUE TO H. PYLORI: ICD-10-CM

## 2020-09-08 PROBLEM — F51.04 PSYCHOPHYSIOLOGICAL INSOMNIA: Status: ACTIVE | Noted: 2020-09-08

## 2020-09-08 NOTE — PROGRESS NOTES
Virtual Visit: Established Patient   This visit was conducted via Zoom using secure and encrypted videoconferencing technology. The patient was in a private location in the state of [place].    The patient's identity was confirmed and verbal consent was obtained for this virtual visit.    Subjective:   CC: Epigastric pain      Stacia Lindsey is a 29 y.o. female presenting for evaluation and management of:    Patient was seen by myself on August 20, 2024 intermittent epigastric pain.  She was recently tested positive for H. pylori infection.  Patient present today to discuss management.  Denies any nausea, vomiting, hematochezia, melena.  Patient has been losing weight, however, she attributes that to ongoing depression and anxiety.      Patient has been suffering depression with anxiety.  She was started on citalopram 10 mg daily.  Patient has been on this medication for several days.  She is tolerating medication well, no side effects reported except for fine tremor that is resolving.  Patient is taking trazodone for insomnia.  She states that she has been taking half a tablet of trazodone 50 mg nightly for sleep.  She states that she is sleeping well with the medication.  She will establish care with counseling for mental health.    ROS   Denies any recent fevers or chills. No nausea or vomiting. No chest pains or shortness of breath.     No Known Allergies    Current medicines (including changes today)  Current Outpatient Medications   Medication Sig Dispense Refill   • Bismuth 262 MG Chew Tab Take 2 Tabs by mouth every 4 hours for 14 days. 112 Tab 0   • metroNIDAZOLE (FLAGYL) 500 MG Tab Take 1 Tab by mouth 3 times a day for 14 days. 42 Tab 0   • omeprazole (PRILOSEC) 20 MG delayed-release capsule Take 1 Cap by mouth 2 times a day. 28 Cap 0   • tetracycline (SUMYCIN) 500 MG Cap Take 1 Cap by mouth 4 times a day for 14 days. 56 Cap 0   • citalopram (CELEXA) 10 MG tablet Take 1 Tab by mouth every day. 90 Tab  "0   • traZODone (DESYREL) 50 MG Tab Take 1 Tab by mouth at bedtime as needed for Sleep. 90 Tab 1   • ALPRAZolam (XANAX) 0.25 MG Tab Take 1 Tab by mouth at bedtime as needed for Anxiety for up to 30 days. 30 Tab 0     No current facility-administered medications for this visit.        Patient Active Problem List    Diagnosis Date Noted   • Psychophysiological insomnia 09/08/2020   • Bacterial infection due to H. pylori 09/04/2020   • Depression with anxiety 09/04/2020   • Female fertility problem 08/20/2020   • PCOS (polycystic ovarian syndrome) 08/18/2020   • Panic attacks 08/17/2020   • Lactose intolerance 12/21/2016       Family History   Problem Relation Age of Onset   • Cancer Mother 45        breast cancer    • Hypertension Father        She  has a past medical history of Lactose intolerance.  She  has no past surgical history on file.       Objective:   Ht 1.702 m (5' 7\")   Wt 50.3 kg (111 lb) Comment: pt stated  BMI 17.39 kg/m²     Physical Exam:  Constitutional: Alert, no distress, well-groomed.  Skin: No rashes in visible areas.  Eye: Round. Conjunctiva clear, lids normal. No icterus.   ENMT: Lips pink without lesions, good dentition, moist mucous membranes. Phonation normal.  Neck: No masses, no thyromegaly. Moves freely without pain.  Respiratory: Unlabored respiratory effort, no cough or audible wheeze  Psych: Alert and oriented x3, normal affect and mood.       Assessment and Plan:   The following treatment plan was discussed:     1. Bacterial infection due to H. pylori  - H. PYLORI, UREA BREATH TEST, ADULT; Future  - Bismuth 262 MG Chew Tab; Take 2 Tabs by mouth every 4 hours for 14 days.  Dispense: 112 Tab; Refill: 0  - metroNIDAZOLE (FLAGYL) 500 MG Tab; Take 1 Tab by mouth 3 times a day for 14 days.  Dispense: 42 Tab; Refill: 0  - omeprazole (PRILOSEC) 20 MG delayed-release capsule; Take 1 Cap by mouth 2 times a day.  Dispense: 28 Cap; Refill: 0  - tetracycline (SUMYCIN) 500 MG Cap; Take 1 Cap by " mouth 4 times a day for 14 days.  Dispense: 56 Cap; Refill: 0  - H.Pylori test ordered for her .     2. Depression with anxiety  Acute depression with anxiety, recently started on Celexa 10 mg daily.  Patient tolerated medication well, no side effects reported except for fine tremor that is resolving.  Patient will establish care with behavioral counseling for mental health.  -Continue Celexa 10 mg daily  -Establish care with behavioral counseling  -Follow-up in 3 months    3. Psychophysiological insomnia  Ongoing psychophysiological insomnia secondary to poorly controlled depression with anxiety.  She was recently started on trazodone which is effective for her symptoms.  - cont Trazodone 25 mg qd (0.5 tablet).   - regular exercise, well-balanced diet, multi-vitamin daily, meditation, stress reduction.   - Avoid excessive consumption of stimulants, alcohol, illicit drugs, tobacco  - Avoid using computer or electronic devices at night  - Avoid watching TV on bed  - Avoid napping during the day  - Treat anxiety and depression as above  - f/u in 3 months.        Follow-up: Return in about 3 months (around 12/4/2020) for Multiple issues.

## 2020-09-10 ENCOUNTER — HOSPITAL ENCOUNTER (EMERGENCY)
Dept: HOSPITAL 8 - ED | Age: 30
Discharge: HOME | End: 2020-09-10
Payer: COMMERCIAL

## 2020-09-10 VITALS — SYSTOLIC BLOOD PRESSURE: 111 MMHG | DIASTOLIC BLOOD PRESSURE: 75 MMHG

## 2020-09-10 VITALS — WEIGHT: 110.45 LBS | BODY MASS INDEX: 17.34 KG/M2 | HEIGHT: 67 IN

## 2020-09-10 DIAGNOSIS — R10.13: ICD-10-CM

## 2020-09-10 DIAGNOSIS — F41.1: Primary | ICD-10-CM

## 2020-09-10 DIAGNOSIS — R07.89: ICD-10-CM

## 2020-09-10 LAB
ALBUMIN SERPL-MCNC: 4.1 G/DL (ref 3.4–5)
ALP SERPL-CCNC: 46 U/L (ref 45–117)
ALT SERPL-CCNC: 19 U/L (ref 12–78)
ANION GAP SERPL CALC-SCNC: 8 MMOL/L (ref 5–15)
BASOPHILS # BLD AUTO: 0.03 X10^3/UL (ref 0–0.1)
BASOPHILS NFR BLD AUTO: 0 % (ref 0–1)
BILIRUB SERPL-MCNC: 0.4 MG/DL (ref 0.2–1)
CALCIUM SERPL-MCNC: 9 MG/DL (ref 8.5–10.1)
CHLORIDE SERPL-SCNC: 114 MMOL/L (ref 98–107)
CREAT SERPL-MCNC: 0.97 MG/DL (ref 0.55–1.02)
EOSINOPHIL # BLD AUTO: 0.07 X10^3/UL (ref 0–0.4)
EOSINOPHIL NFR BLD AUTO: 1 % (ref 1–7)
ERYTHROCYTE [DISTWIDTH] IN BLOOD BY AUTOMATED COUNT: 13.5 % (ref 9.6–15.2)
LYMPHOCYTES # BLD AUTO: 1.2 X10^3/UL (ref 1–3.4)
LYMPHOCYTES NFR BLD AUTO: 15 % (ref 22–44)
MCH RBC QN AUTO: 30.2 PG (ref 27–34.8)
MCHC RBC AUTO-ENTMCNC: 32.4 G/DL (ref 32.4–35.8)
MCV RBC AUTO: 93 FL (ref 80–100)
MD: NO
MONOCYTES # BLD AUTO: 0.64 X10^3/UL (ref 0.2–0.8)
MONOCYTES NFR BLD AUTO: 8 % (ref 2–9)
NEUTROPHILS # BLD AUTO: 6.13 X10^3/UL (ref 1.8–6.8)
NEUTROPHILS NFR BLD AUTO: 76 % (ref 42–75)
PLATELET # BLD AUTO: 259 X10^3/UL (ref 130–400)
PMV BLD AUTO: 7.6 FL (ref 7.4–10.4)
PROT SERPL-MCNC: 7.3 G/DL (ref 6.4–8.2)
RBC # BLD AUTO: 4.52 X10^6/UL (ref 3.82–5.3)

## 2020-09-10 PROCEDURE — 93005 ELECTROCARDIOGRAM TRACING: CPT

## 2020-09-10 PROCEDURE — 80053 COMPREHEN METABOLIC PANEL: CPT

## 2020-09-10 PROCEDURE — 85025 COMPLETE CBC W/AUTO DIFF WBC: CPT

## 2020-09-10 PROCEDURE — 99284 EMERGENCY DEPT VISIT MOD MDM: CPT

## 2020-09-10 PROCEDURE — 36415 COLL VENOUS BLD VENIPUNCTURE: CPT

## 2020-09-10 RX ORDER — OMEPRAZOLE 20 MG/1
40 CAPSULE, DELAYED RELEASE ORAL 2 TIMES DAILY
Qty: 56 CAP | Refills: 0 | Status: SHIPPED | OUTPATIENT
Start: 2020-09-10 | End: 2020-09-24

## 2020-09-16 RX ORDER — FLUCONAZOLE 150 MG/1
150 TABLET ORAL ONCE
Qty: 1 TAB | Refills: 0 | Status: SHIPPED | OUTPATIENT
Start: 2020-09-16 | End: 2020-09-16

## 2020-09-18 ENCOUNTER — OFFICE VISIT (OUTPATIENT)
Dept: URGENT CARE | Facility: PHYSICIAN GROUP | Age: 30
End: 2020-09-18
Payer: COMMERCIAL

## 2020-09-18 ENCOUNTER — TELEPHONE (OUTPATIENT)
Dept: MEDICAL GROUP | Age: 30
End: 2020-09-18

## 2020-09-18 VITALS
HEART RATE: 78 BPM | WEIGHT: 106 LBS | SYSTOLIC BLOOD PRESSURE: 100 MMHG | TEMPERATURE: 98 F | RESPIRATION RATE: 18 BRPM | OXYGEN SATURATION: 97 % | BODY MASS INDEX: 16.64 KG/M2 | DIASTOLIC BLOOD PRESSURE: 64 MMHG | HEIGHT: 67 IN

## 2020-09-18 DIAGNOSIS — R10.13 EPIGASTRIC PAIN: ICD-10-CM

## 2020-09-18 DIAGNOSIS — A04.8 BACTERIAL INFECTION DUE TO H. PYLORI: ICD-10-CM

## 2020-09-18 PROCEDURE — 99214 OFFICE O/P EST MOD 30 MIN: CPT | Performed by: PHYSICIAN ASSISTANT

## 2020-09-18 RX ORDER — SUCRALFATE 1 G/1
1 TABLET ORAL
Qty: 120 TAB | Refills: 3 | Status: SHIPPED
Start: 2020-09-18 | End: 2020-11-23

## 2020-09-18 ASSESSMENT — ENCOUNTER SYMPTOMS
STRIDOR: 0
DIZZINESS: 0
NAUSEA: 1
NERVOUS/ANXIOUS: 1
FEVER: 0
BLOOD IN STOOL: 0
ABDOMINAL PAIN: 1
SHORTNESS OF BREATH: 0
PALPITATIONS: 0
VOMITING: 0
WHEEZING: 0
FLANK PAIN: 0
BRUISES/BLEEDS EASILY: 0
SORE THROAT: 0
BLURRED VISION: 0
DIARRHEA: 0
CHILLS: 0
COUGH: 0
CONSTIPATION: 0

## 2020-09-18 ASSESSMENT — FIBROSIS 4 INDEX: FIB4 SCORE: 0.4

## 2020-09-18 NOTE — PROGRESS NOTES
Subjective:      Stacia Lindsey is a 29 y.o. female who presents with Abdominal Pain (upper stomach pain, is currently taking meds for hpylori, rash on chest and face x4days )    Patient has been having intermittent epigastric pain.  She tested positive for H. pylori infection approximately 2weeks and was started on treatment of metronidazole, tetracycline, omeprazole, and bismuth subsalicylate.  States that approximately 2 days after initiating treatment she was seen in the emergency department because she was having severe stomach pain and heartburn.  They gave her a GI cocktail in the ER and had her increase her dose of omeprazole to 40 mg daily.  States that that has been helping but last night she got worsening epigastric pain again to the point where she is unable to eat anything and today she continues to have increased pain in that area though it is less than it was last night.  She has some nausea but has not vomited.  States her bowel movements have been normal.  No fever/chills.  Additionally, patient developed a rash on her neck and face approximately 4 days ago and has been gradually worsening.  She now has it on her upper chest and upper back as well.  He is not very bothersome.  No associated shortness of breath or sore throat.      Review of Systems   Constitutional: Negative for chills, fever and malaise/fatigue.   HENT: Negative for sore throat.    Eyes: Negative for blurred vision.   Respiratory: Negative for cough, shortness of breath, wheezing and stridor.    Cardiovascular: Negative for chest pain and palpitations.   Gastrointestinal: Positive for abdominal pain and nausea. Negative for blood in stool, constipation, diarrhea, melena and vomiting.   Genitourinary: Negative for dysuria, flank pain, frequency and urgency.   Skin: Positive for rash.   Neurological: Negative for dizziness.   Endo/Heme/Allergies: Does not bruise/bleed easily.   Psychiatric/Behavioral: The patient is  "nervous/anxious.        PMH:  has a past medical history of Lactose intolerance.  MEDS:   Current Outpatient Medications:   •  sucralfate (CARAFATE) 1 GM Tab, Take 1 Tab by mouth 4 Times a Day,Before Meals and at Bedtime., Disp: 120 Tab, Rfl: 3  •  hyoscyamine-maalox plus-lidocaine viscous (GI COCKTAIL), Take 30 mL by mouth Once for 1 dose., Disp: 30 mL, Rfl: 0  •  omeprazole (PRILOSEC) 20 MG delayed-release capsule, Take 2 Caps by mouth 2 times a day for 14 days., Disp: 56 Cap, Rfl: 0  •  Bismuth 262 MG Chew Tab, Take 2 Tabs by mouth every 4 hours for 14 days., Disp: 112 Tab, Rfl: 0  •  tetracycline (SUMYCIN) 500 MG Cap, Take 1 Cap by mouth 4 times a day for 14 days., Disp: 56 Cap, Rfl: 0  •  citalopram (CELEXA) 10 MG tablet, Take 1 Tab by mouth every day., Disp: 90 Tab, Rfl: 0  •  traZODone (DESYREL) 50 MG Tab, Take 1 Tab by mouth at bedtime as needed for Sleep., Disp: 90 Tab, Rfl: 1  •  metroNIDAZOLE (FLAGYL) 500 MG Tab, Take 1 Tab by mouth 3 times a day for 14 days., Disp: 42 Tab, Rfl: 0  ALLERGIES: No Known Allergies  SURGHX: No past surgical history on file.  SOCHX:  reports that she has never smoked. She has never used smokeless tobacco. She reports previous alcohol use. She reports that she does not use drugs.  FH: Family history was reviewed, no pertinent findings to report     Objective:     /64   Pulse 78   Temp 36.7 °C (98 °F) (Temporal)   Resp 18   Ht 1.702 m (5' 7\")   Wt 48.1 kg (106 lb)   SpO2 97%   BMI 16.60 kg/m²      Physical Exam  Constitutional:       Appearance: She is well-developed.   HENT:      Head: Normocephalic and atraumatic.      Right Ear: External ear normal.      Left Ear: External ear normal.   Eyes:      Conjunctiva/sclera: Conjunctivae normal.      Pupils: Pupils are equal, round, and reactive to light.   Neck:      Musculoskeletal: Normal range of motion.   Cardiovascular:      Rate and Rhythm: Normal rate and regular rhythm.      Heart sounds: Normal heart sounds. No " murmur.   Pulmonary:      Effort: Pulmonary effort is normal.      Breath sounds: Normal breath sounds. No wheezing.   Abdominal:      General: Abdomen is flat. Bowel sounds are increased.      Palpations: Abdomen is soft.      Tenderness: There is generalized abdominal tenderness and tenderness in the epigastric area. There is no guarding or rebound. Negative signs include Harmon's sign.   Lymphadenopathy:      Cervical: No cervical adenopathy.   Skin:     General: Skin is warm and dry.      Capillary Refill: Capillary refill takes less than 2 seconds.      Findings: Rash present.      Comments: Erythematous pinpoint papular rash with some macular lesions as well noted on the upper chest, neck circumferentially, and face.  It blanches with pressure.  No vesicular lesions noted.  No signs of secondary bacterial skin infection.   Neurological:      Mental Status: She is alert and oriented to person, place, and time.   Psychiatric:         Behavior: Behavior normal.         Judgment: Judgment normal.            Assessment/Plan:       1. Epigastric pain  - sucralfate (CARAFATE) 1 GM Tab; Take 1 Tab by mouth 4 Times a Day,Before Meals and at Bedtime.  Dispense: 120 Tab; Refill: 3  - hyoscyamine-maalox plus-lidocaine viscous (GI COCKTAIL); Take 30 mL by mouth Once for 1 dose.  Dispense: 30 mL; Refill: 0    2. Bacterial infection due to H. pylori  - sucralfate (CARAFATE) 1 GM Tab; Take 1 Tab by mouth 4 Times a Day,Before Meals and at Bedtime.  Dispense: 120 Tab; Refill: 3  - hyoscyamine-maalox plus-lidocaine viscous (GI COCKTAIL); Take 30 mL by mouth Once for 1 dose.  Dispense: 30 mL; Refill: 0      *Patient was observed following the GI cocktail.  She reported feeling significant improvement.  We will start her on Carafate to help with the symptoms as well.  Would like her to follow-up with her PCP on Monday for further evaluation.  The rash is fairly localized and is not bothersome so would recommend that she finish out  the course of medication at this time.  If it becomes significantly worse she can stop it and her PCP will start her on a new course of treatment if necessary.  If it is itching recommend that she apply some calamine lotion or Benadryl cream.  Also recommended cool compresses.                Differential Diagnosis, natural history, and supportive care discussed. Return to the Urgent Care or follow up with your PCP if symptoms fail to resolve, or for any new or worsening symptoms. Emergency room precautions discussed. Patient and/or family appears understanding of information.

## 2020-09-19 NOTE — TELEPHONE ENCOUNTER
Phone Number Called: [facility]    Call outcome: spoke to rep    Message: wanted to know if your were aware of the drug-drug interaction with citalopram and diflucan and want to know if you still wanted them to fill the diflucan

## 2020-09-19 NOTE — TELEPHONE ENCOUNTER
The risk is low. As far as I am aware, there aren't many problems with these two medications. It is ok for her to take it. If she is concerned, she can use intravaginal antifungal medication over the counter call Monistat 1 or Monistat 3 instead of Diflucan.   Brisa Teixeira M.D.

## 2020-09-21 ENCOUNTER — OFFICE VISIT (OUTPATIENT)
Dept: MEDICAL GROUP | Age: 30
End: 2020-09-21
Payer: COMMERCIAL

## 2020-09-21 VITALS
HEIGHT: 67 IN | TEMPERATURE: 98.6 F | OXYGEN SATURATION: 97 % | HEART RATE: 63 BPM | BODY MASS INDEX: 17.11 KG/M2 | DIASTOLIC BLOOD PRESSURE: 62 MMHG | SYSTOLIC BLOOD PRESSURE: 102 MMHG | WEIGHT: 109 LBS

## 2020-09-21 DIAGNOSIS — A04.8 BACTERIAL INFECTION DUE TO H. PYLORI: Primary | ICD-10-CM

## 2020-09-21 DIAGNOSIS — R21 SKIN RASH: ICD-10-CM

## 2020-09-21 DIAGNOSIS — F41.8 DEPRESSION WITH ANXIETY: ICD-10-CM

## 2020-09-21 DIAGNOSIS — F51.04 PSYCHOPHYSIOLOGICAL INSOMNIA: ICD-10-CM

## 2020-09-21 PROCEDURE — 99214 OFFICE O/P EST MOD 30 MIN: CPT | Performed by: FAMILY MEDICINE

## 2020-09-21 ASSESSMENT — FIBROSIS 4 INDEX: FIB4 SCORE: 0.4

## 2020-09-21 NOTE — TELEPHONE ENCOUNTER
Phone Number Called: [facility]    Call outcome: LVM    Message: LVM stating it was okay to take both medications

## 2020-09-23 NOTE — PROGRESS NOTES
Subjective:   CC: Depression with anxiety follow-up    HPI:     Stacia Lindsey is a 29 y.o. female, established patient of the clinic, presents with the following concerns:     Patient was recently diagnosed with depression with anxiety.  She was started on citalopram 10 mg daily.  Patient has been on this medication for about 2 weeks.  She reports feeling better.  Panic attack frequency is reducing.  She tolerates medication well, no side effect reported.  Patient is taking trazodone 25 mg daily for sleep.  That she has been sleeping much better with medication.  Patient has started little counseling with a Fijian-speaking therapist.    Patient was also recently diagnosed with a thyroid implant.  She was treated with quadruple therapy for 2 Weeks.  She did have mild, irritating, generalized papular rash and while taking medications.  However, the rash has since resolved.    Current medicines (including changes today)  Current Outpatient Medications   Medication Sig Dispense Refill   • sucralfate (CARAFATE) 1 GM Tab Take 1 Tab by mouth 4 Times a Day,Before Meals and at Bedtime. 120 Tab 3   • omeprazole (PRILOSEC) 20 MG delayed-release capsule Take 2 Caps by mouth 2 times a day for 14 days. 56 Cap 0   • citalopram (CELEXA) 10 MG tablet Take 1 Tab by mouth every day. 90 Tab 0   • traZODone (DESYREL) 50 MG Tab Take 1 Tab by mouth at bedtime as needed for Sleep. 90 Tab 1     No current facility-administered medications for this visit.      She  has a past medical history of Lactose intolerance.    I personally reviewed patient's problem list, allergies, medications, family hx, social hx with patient and update EPIC.     REVIEW OF SYSTEMS:  CONSTITUTIONAL:  Denies night sweats, fatigue, malaise, lethargy, fever or chills.  RESPIRATORY:  Denies cough, wheeze, hemoptysis, or shortness of breath.  CARDIOVASCULAR:  Denies chest pains, palpitations, pedal edema     Objective:     /62 (BP Location: Left arm,  "Patient Position: Sitting, BP Cuff Size: Adult)   Pulse 63   Temp 37 °C (98.6 °F) (Temporal)   Ht 1.702 m (5' 7\")   Wt 49.4 kg (109 lb)   SpO2 97%  Body mass index is 17.07 kg/m².    Physical Exam:  Constitutional: awake, alert, in no distress.  Skin: Warm, dry, good turgor, no rashes, bruises, ulcers in visible areas.  Eye: conjunctiva clear, lids neg for edema or lesions.  Neck: Trachea midline, no masses, no thyromegaly. No cervical or supraclavicular lymphadenopathy  Respiratory: Unlabored respiratory effort, lungs clear to auscultation, no wheezes, no rales.  Cardiovascular: Normal S1, S2, no murmur, no pedal edema.  Abdomen: Soft, non-tender to palpation, active BS, no hernia, no hepatosplenomegaly, negative rebound or guarding.   Psych: Oriented x3, affect and mood wnl, intact judgement and insight.       Assessment and Plan:   The following treatment plan was discussed    1. Bacterial infection due to H. Pylori  2. Skin rash  S/p quadruple therapy for 14 days. She did have fine, irritating papular rash while taking medications, but the rash has completely resolved after completion of therapy. She continues to take Omeprazole 20 mg BID for now. She will have repeat urea breath tests in a few months to confirm eradication.   - slowly wean off Omeprazole as tolerated.   - repeat urea breath test prior to next OV.     3. Depression with anxiety  Improving with Celexa 10 mg qd.   Has started to work with therapist.   Doing much better.   - cont to work with  and Celexa  - f/u in 3 months  - Appropriate counseling provided. Topics might include: regular exercise, well-balanced diet, multi-vitamin daily, weight loss, adequate sleep, meditation, stress management, avoidance of excessive consumption of caffeine, alcohol, illicit drugs, tobacco.      4. Psychophysiological insomnia  Improving with Trazodone 25 mg qd, no s/e reported, will cont.       Brisa Teixeira M.D.      Followup: Return in about 3 months " (around 12/21/2020) for Mental Health F/U.    Please note that this dictation was created using voice recognition software. I have made every reasonable attempt to correct obvious errors, but I expect that there are errors of grammar and possibly content that I did not discover before finalizing the note.

## 2020-11-04 ENCOUNTER — APPOINTMENT (OUTPATIENT)
Dept: BEHAVIORAL HEALTH | Facility: CLINIC | Age: 30
End: 2020-11-04
Payer: COMMERCIAL

## 2020-11-16 ENCOUNTER — HOSPITAL ENCOUNTER (OUTPATIENT)
Dept: LAB | Facility: MEDICAL CENTER | Age: 30
End: 2020-11-16
Attending: FAMILY MEDICINE
Payer: COMMERCIAL

## 2020-11-16 DIAGNOSIS — A04.8 BACTERIAL INFECTION DUE TO H. PYLORI: ICD-10-CM

## 2020-11-16 PROCEDURE — 83013 H PYLORI (C-13) BREATH: CPT

## 2020-11-17 LAB — UREA BREATH TEST QL: NEGATIVE

## 2020-11-18 PROBLEM — Z86.19 HISTORY OF HELICOBACTER PYLORI INFECTION: Status: ACTIVE | Noted: 2020-09-04

## 2020-11-23 ENCOUNTER — TELEMEDICINE (OUTPATIENT)
Dept: MEDICAL GROUP | Age: 30
End: 2020-11-23
Payer: COMMERCIAL

## 2020-11-23 VITALS — WEIGHT: 106 LBS | BODY MASS INDEX: 16.64 KG/M2 | HEIGHT: 67 IN

## 2020-11-23 DIAGNOSIS — M62.838 CERVICAL PARASPINAL MUSCLE SPASM: ICD-10-CM

## 2020-11-23 DIAGNOSIS — F41.8 DEPRESSION WITH ANXIETY: ICD-10-CM

## 2020-11-23 DIAGNOSIS — Z86.19 HISTORY OF HELICOBACTER PYLORI INFECTION: ICD-10-CM

## 2020-11-23 DIAGNOSIS — N97.9 FEMALE FERTILITY PROBLEM: ICD-10-CM

## 2020-11-23 DIAGNOSIS — E73.9 LACTOSE INTOLERANCE: ICD-10-CM

## 2020-11-23 DIAGNOSIS — E28.2 PCOS (POLYCYSTIC OVARIAN SYNDROME): ICD-10-CM

## 2020-11-23 DIAGNOSIS — Z00.00 PE (PHYSICAL EXAM), ANNUAL: ICD-10-CM

## 2020-11-23 DIAGNOSIS — F41.0 PANIC ATTACKS: ICD-10-CM

## 2020-11-23 PROCEDURE — 99395 PREV VISIT EST AGE 18-39: CPT | Mod: 95,CR | Performed by: FAMILY MEDICINE

## 2020-11-23 PROCEDURE — 99214 OFFICE O/P EST MOD 30 MIN: CPT | Mod: 25 | Performed by: FAMILY MEDICINE

## 2020-11-23 RX ORDER — CITALOPRAM 20 MG/1
20 TABLET ORAL DAILY
Qty: 90 TAB | Refills: 1 | Status: SHIPPED | OUTPATIENT
Start: 2020-11-23 | End: 2021-02-02 | Stop reason: SDUPTHER

## 2020-11-23 RX ORDER — TIZANIDINE 4 MG/1
4 TABLET ORAL EVERY 6 HOURS PRN
Qty: 60 TAB | Refills: 0 | Status: SHIPPED
Start: 2020-11-23 | End: 2021-02-02

## 2020-11-23 RX ORDER — ALPRAZOLAM 0.25 MG/1
0.25 TABLET ORAL NIGHTLY PRN
Qty: 30 TAB | Refills: 0 | Status: SHIPPED | OUTPATIENT
Start: 2020-11-23 | End: 2020-12-23

## 2020-11-23 ASSESSMENT — FIBROSIS 4 INDEX: FIB4 SCORE: 0.42

## 2020-11-23 NOTE — PROGRESS NOTES
Virtual Visit: Established Patient   This visit was conducted via Zoom using secure and encrypted videoconferencing technology. The patient was in a private location in the state of [place].    The patient's identity was confirmed and verbal consent was obtained for this virtual visit.    Subjective:   CC: Annual physical    Stacia Lindsey is a 30 y.o. female with history of depression, anxiety, and intermittent nasal tach requiring multiple recent ER visits. Patient was recently started on citalopram 10 mg daily.  Patient also taking Xanax 0.5 mg as needed for anxiety flare.  Patient is working with Kazakh speaking therapist weekly.  Today, patient states that her symptoms have improved approximately 50%.  Patient tolerated medication well, no side effect reported.  Patient states that since she started on pharmacotherapy, patient has not visited ER.  In general, she needed to use Xanax once every 2 weeks.  Patient is pleased with the improvement of symptoms.    Patient was recently tested positive for H. pylori.  She was treated approximately 3 months ago.  Her most recent urea breath test was negative.  Patient denies any active GI symptoms.  Her appetite is good.  She no longer has any epigastric pain.    Patient is G0, P0, was diagnosed with polycystic ovarian syndrome leading to infertility.  Patient was working with fertility specialist prior to the pandemic, but not currently.  She will follow-up with her fertility specialist once her mental health is under good control.    Acute concerns:  And complains of intermittent neck muscles spasm leading to pain and discomfort/headache onset set a few months ago.  Negative history of previous neck injury or neurological symptoms.  Patient states that when she takes Xanax as needed for anxiety flare, her neck symptoms are better.  Patient is interested in medication to help with muscle spasm.       ROS     Denies any recent fevers or chills. No nausea or  "vomiting. No chest pains or shortness of breath.     No Known Allergies    Current medicines (including changes today)  Current Outpatient Medications   Medication Sig Dispense Refill   • tizanidine (ZANAFLEX) 4 MG Tab Take 1 Tab by mouth every 6 hours as needed. 60 Tab 0   • citalopram (CELEXA) 20 MG Tab Take 1 Tab by mouth every day. 90 Tab 1   • ALPRAZolam (XANAX) 0.25 MG Tab Take 1 Tab by mouth at bedtime as needed for Anxiety for up to 30 days. 30 Tab 0   • traZODone (DESYREL) 50 MG Tab Take 1 Tab by mouth at bedtime as needed for Sleep. 90 Tab 1     No current facility-administered medications for this visit.        Patient Active Problem List    Diagnosis Date Noted   • Cervical paraspinal muscle spasm 11/23/2020   • Psychophysiological insomnia 09/08/2020   • History of Helicobacter pylori infection 09/04/2020   • Depression with anxiety 09/04/2020   • Female fertility problem 08/20/2020   • PCOS (polycystic ovarian syndrome) 08/18/2020   • Panic attacks 08/17/2020   • Lactose intolerance 12/21/2016       Family History   Problem Relation Age of Onset   • Cancer Mother 45        breast cancer    • Hypertension Father        She  has a past medical history of Lactose intolerance.  She  has no past surgical history on file.       Objective:   Ht 1.702 m (5' 7\") Comment: pt. stated  Wt 48.1 kg (106 lb) Comment: pt. stated  BMI 16.60 kg/m²     Physical Exam:  Constitutional: Alert, no distress, well-groomed.  Skin: No rashes in visible areas.  Eye: Round. Conjunctiva clear, lids normal. No icterus.   ENMT: Lips pink without lesions, good dentition, moist mucous membranes. Phonation normal.  Neck: No masses, no thyromegaly. Moves freely without pain.  Respiratory: Unlabored respiratory effort, no cough or audible wheeze  Psych: Alert and oriented x3, normal affect and mood.       Assessment and Plan:   The following treatment plan was discussed:     1. Depression with anxiety  2. Panic attacks  Chronic, " partially controlled with Celexa 10 mg daily and behavioral counseling. Patient also taking Xanax 0.25 mg as needed for anxiety flare.  Since starting on medication approximately 3 months ago, patient has not been visiting ER for anxiety flare, and frequency of present attack is less. Will increase the dose of citalopram to 20 mg daily.  Patient was advised to continue to follow-up with counseling.  Patient tolerated medication well, no side effect reported.  Negative history of drugs, alcohol, tobacco abuse  - citalopram (CELEXA) 20 MG Tab; Take 1 Tab by mouth every day.  Dispense: 90 Tab; Refill: 1  - ALPRAZolam (XANAX) 0.25 MG Tab; Take 1 Tab by mouth at bedtime as needed for Anxiety for up to 30 days.  Dispense: 30 Tab; Refill: 0  - cont BH  - regular exercises   -Avoid drugs, alcohol, tobacco    3. History of Helicobacter pylori infection  Patient was tested positive for H. pylori infection.  She was treated approximately 3 months ago.  Eradication confirmed with recent UBT  GI symptoms have resolved.    4. Lactose intolerance  Avoid diary products  Takes Lactaid PRN     5. PCOS (polycystic ovarian syndrome)  6. Female fertility problem  F/u with fertility specialist PRN     7. PE (physical exam), annual  General health and wellness counseling provided.     Pt declined all vaccines     8. Cervical paraspinal muscle spasm  Acute, intermittent neck muscle spasm w/o hx of trauma.   - tizanidine (ZANAFLEX) 4 MG Tab; Take 1 Tab by mouth every 6 hours as needed.  Dispense: 60 Tab; Refill: 0  - neck exercises  - head/shoulder support at night          Follow-up: Return in about 6 months (around 5/23/2021) for annual PE.

## 2020-11-30 DIAGNOSIS — Z00.00 PE (PHYSICAL EXAM), ANNUAL: ICD-10-CM

## 2020-12-04 ENCOUNTER — HOSPITAL ENCOUNTER (OUTPATIENT)
Dept: LAB | Facility: MEDICAL CENTER | Age: 30
End: 2020-12-04
Attending: FAMILY MEDICINE
Payer: COMMERCIAL

## 2020-12-04 DIAGNOSIS — Z00.00 PE (PHYSICAL EXAM), ANNUAL: ICD-10-CM

## 2020-12-04 LAB
CHOLEST SERPL-MCNC: 169 MG/DL (ref 100–199)
FASTING STATUS PATIENT QL REPORTED: NORMAL
HDLC SERPL-MCNC: 78 MG/DL
LDLC SERPL CALC-MCNC: 82 MG/DL
TRIGL SERPL-MCNC: 43 MG/DL (ref 0–149)

## 2020-12-04 PROCEDURE — 80061 LIPID PANEL: CPT

## 2020-12-04 PROCEDURE — 36415 COLL VENOUS BLD VENIPUNCTURE: CPT

## 2021-02-02 ENCOUNTER — TELEMEDICINE (OUTPATIENT)
Dept: MEDICAL GROUP | Age: 31
End: 2021-02-02
Payer: COMMERCIAL

## 2021-02-02 VITALS — BODY MASS INDEX: 17.11 KG/M2 | HEIGHT: 67 IN | WEIGHT: 109 LBS

## 2021-02-02 DIAGNOSIS — F41.0 PANIC ATTACKS: ICD-10-CM

## 2021-02-02 DIAGNOSIS — E28.2 PCOS (POLYCYSTIC OVARIAN SYNDROME): ICD-10-CM

## 2021-02-02 DIAGNOSIS — F41.8 DEPRESSION WITH ANXIETY: Primary | ICD-10-CM

## 2021-02-02 DIAGNOSIS — N93.9 ABNORMAL UTERINE BLEEDING: ICD-10-CM

## 2021-02-02 DIAGNOSIS — F51.04 PSYCHOPHYSIOLOGICAL INSOMNIA: ICD-10-CM

## 2021-02-02 PROCEDURE — 99214 OFFICE O/P EST MOD 30 MIN: CPT | Mod: 95 | Performed by: FAMILY MEDICINE

## 2021-02-02 RX ORDER — CITALOPRAM 20 MG/1
20 TABLET ORAL DAILY
Qty: 90 TAB | Refills: 3 | Status: SHIPPED
Start: 2021-02-02 | End: 2021-10-22

## 2021-02-02 RX ORDER — TRAZODONE HYDROCHLORIDE 50 MG/1
50 TABLET ORAL
Qty: 90 TAB | Refills: 3 | Status: SHIPPED | OUTPATIENT
Start: 2021-02-02 | End: 2023-06-22

## 2021-02-02 ASSESSMENT — FIBROSIS 4 INDEX: FIB4 SCORE: 0.42

## 2021-02-02 ASSESSMENT — PATIENT HEALTH QUESTIONNAIRE - PHQ9: CLINICAL INTERPRETATION OF PHQ2 SCORE: 0

## 2021-02-02 NOTE — PROGRESS NOTES
Virtual Visit: Established Patient   This visit was conducted via Zoom using secure and encrypted videoconferencing technology. The patient was in a private location in the state of [place].    The patient's identity was confirmed and verbal consent was obtained for this virtual visit.    Subjective:   CC: abnormal uterine bleeding     Stacia Lindsey is a 30 y.o. female  with hx of PCOS leading to irregular menses and fertility problems. Her LMP was on 2021 lasting for about 8 days. She states that she did not have menstruation in 2020. She c/o acute mild vaginal spotting 4 days prior to her last menstruation. She then again experienced mild vaginal spotting 2 days after her last menstruation. She denies any fever, chills, nausea, vomiting, breast tenderness, pelvic pain, pain with sex, genital rash, or any other pelvic symptoms.  Patient has appointment to establish care with new OB/GYN at the end of February.    Patient has ongoing depression with anxiety with psychophysiological insomnia.  She is taking citalopram 20 mg milligrams daily and 25 mg of trazodone for sleep.  Patient states that her mental health is in excellent control.  She no longer having frequent panic attacks.  She does not need to use anxiolytic for panic attacks.  Patient is attending school majoring in psychology.  She denied suicidal ideation or plans.  She is very happy with her life currently.    ROS   Denies any recent fevers or chills. No nausea or vomiting. No chest pains or shortness of breath.     No Known Allergies    Current medicines (including changes today)  Current Outpatient Medications   Medication Sig Dispense Refill   • traZODone (DESYREL) 50 MG Tab Take 1 Tab by mouth at bedtime as needed for Sleep. 90 Tab 3   • citalopram (CELEXA) 20 MG Tab Take 1 Tab by mouth every day. 90 Tab 3     No current facility-administered medications for this visit.        Patient Active Problem List    Diagnosis Date Noted   •  "Cervical paraspinal muscle spasm 11/23/2020   • Psychophysiological insomnia 09/08/2020   • History of Helicobacter pylori infection 09/04/2020   • Depression with anxiety 09/04/2020   • Female fertility problem 08/20/2020   • PCOS (polycystic ovarian syndrome) 08/18/2020   • Lactose intolerance 12/21/2016       Family History   Problem Relation Age of Onset   • Cancer Mother 45        breast cancer    • Hypertension Father        She  has a past medical history of Lactose intolerance.  She  has no past surgical history on file.       Objective:   Ht 1.702 m (5' 7\")   Wt 49.4 kg (109 lb) Comment: pt stated  BMI 17.07 kg/m²     Physical Exam:  Constitutional: Alert, no distress, well-groomed.  Skin: No rashes in visible areas.  Eye: Round. Conjunctiva clear, lids normal. No icterus.   ENMT: Lips pink without lesions, good dentition, moist mucous membranes. Phonation normal.  Neck: No masses, no thyromegaly. Moves freely without pain.  Respiratory: Unlabored respiratory effort, no cough or audible wheeze  Psych: Alert and oriented x3, normal affect and mood.       Assessment and Plan:   The following treatment plan was discussed:     1. Depression with anxiety  Chronic, controlled with Citalopram 20 mg and Trazodone 25 mg qhs, no s/e reported, will continue.    - traZODone (DESYREL) 50 MG Tab; Take 1 Tab by mouth at bedtime as needed for Sleep.  Dispense: 90 Tab; Refill: 3  - citalopram (CELEXA) 20 MG Tab; Take 1 Tab by mouth every day.  Dispense: 90 Tab; Refill: 3  - Appropriate counseling provided. Topics might include: regular exercise, well-balanced diet, multi-vitamin daily, weight loss, adequate sleep, meditation, stress management, avoidance of excessive consumption of caffeine, alcohol, illicit drugs, tobacco.      2. Panic attacks  Rare, no longer need to use anxiolytic.     3. Psychophysiological insomnia  Controlled with Trazodone 25 mg qhs.   - traZODone (DESYREL) 50 MG Tab; Take 1 Tab by mouth at " bedtime as needed for Sleep.  Dispense: 90 Tab; Refill: 3  - gradual wean off as tolerated  - ok to try Melatonin.    4. Abnormal uterine bleeding  5. PCOS (polycystic ovarian syndrome)  Mild vaginal spotting with sex before and after her last menstruation in 1/2021.   History does not point to concerning pelvic diseases. Pt should continue to monitor her symptoms and follow up with her OBGYN in a few weeks. She is to email me if her symptoms evolve.        Follow-up: Return for As needed.

## 2021-02-25 ENCOUNTER — GYNECOLOGY VISIT (OUTPATIENT)
Dept: OBGYN | Facility: CLINIC | Age: 31
End: 2021-02-25
Payer: COMMERCIAL

## 2021-02-25 ENCOUNTER — HOSPITAL ENCOUNTER (OUTPATIENT)
Facility: MEDICAL CENTER | Age: 31
End: 2021-02-25
Attending: OBSTETRICS & GYNECOLOGY
Payer: COMMERCIAL

## 2021-02-25 VITALS — BODY MASS INDEX: 17.23 KG/M2 | SYSTOLIC BLOOD PRESSURE: 111 MMHG | DIASTOLIC BLOOD PRESSURE: 62 MMHG | WEIGHT: 110 LBS

## 2021-02-25 DIAGNOSIS — Z80.9 FAMILY HISTORY OF CANCER IN MOTHER: ICD-10-CM

## 2021-02-25 DIAGNOSIS — Z01.419 WELL WOMAN EXAM WITH ROUTINE GYNECOLOGICAL EXAM: ICD-10-CM

## 2021-02-25 PROCEDURE — 88175 CYTOPATH C/V AUTO FLUID REDO: CPT

## 2021-02-25 PROCEDURE — 87491 CHLMYD TRACH DNA AMP PROBE: CPT

## 2021-02-25 PROCEDURE — 99204 OFFICE O/P NEW MOD 45 MIN: CPT | Performed by: OBSTETRICS & GYNECOLOGY

## 2021-02-25 PROCEDURE — 87591 N.GONORRHOEAE DNA AMP PROB: CPT

## 2021-02-25 ASSESSMENT — FIBROSIS 4 INDEX: FIB4 SCORE: 0.42

## 2021-02-26 LAB
C TRACH DNA GENITAL QL NAA+PROBE: NEGATIVE
CYTOLOGY REG CYTOL: NORMAL
N GONORRHOEA DNA GENITAL QL NAA+PROBE: NEGATIVE
SPECIMEN SOURCE: NORMAL

## 2021-02-26 NOTE — PROGRESS NOTES
Stacia Lindsey is a 30 y.o.  female who presents for her Annual Gynecologic Exam      HPI Comments: Pt presents for her annual well woman exam.   Patient states that for the past 3 years she has been trying to become pregnant.  This is caused her much psychological stress and depression.  She states that she was being worked up with Dr. Cee here in [facility] and was told that due to low sperm count, she would need ovulation induction with intrauterine insemination.  She was also diagnosed with polycystic ovarian syndrome.  Her physician then retired and she was never able to follow-up after COVID-19 pandemic.  Currently she states that she would like to get healthier and regulate her cycles more before pursuing further pregnancy.    Patient's last menstrual period was 02/10/2021 (exact date).    Review of Systems:   Pertinent positives documented in HPI and all other systems reviewed & are negative    All PMH, PSH, allergies, social history and FH reviewed and updated today:    PGYN Hx: LMP: 2/10/2021, Cycles usually every 30 days however will be every 60 days at times, length of flow 7 days.  Menarche: 15 years old  Sexually active with male partner, 4 lifetime part, Birth control condoms currently.  Hx STDs: denies  Last pap smear: Unsure, denies history of cervical biopsies or excisional procedures.    OB History    Para Term  AB Living   0 0 0 0 0 0   SAB TAB Ectopic Molar Multiple Live Births   0 0 0   0          Past Medical History:   Diagnosis Date   • Anxiety    • Depression    • Lactose intolerance        History reviewed. No pertinent surgical history.    Medications:   Current Outpatient Medications Ordered in Epic   Medication Sig Dispense Refill   • traZODone (DESYREL) 50 MG Tab Take 1 Tab by mouth at bedtime as needed for Sleep. 90 Tab 3   • citalopram (CELEXA) 20 MG Tab Take 1 Tab by mouth every day. 90 Tab 3     No current Epic-ordered facility-administered medications on  file.       Penicillins    Social History     Socioeconomic History   • Marital status:      Spouse name: Not on file   • Number of children: 0   • Years of education: Not on file   • Highest education level: Not on file   Occupational History   • Not on file   Tobacco Use   • Smoking status: Never Smoker   • Smokeless tobacco: Never Used   Substance and Sexual Activity   • Alcohol use: Yes   • Drug use: No   • Sexual activity: Yes     Partners: Male   Other Topics Concern   • Not on file   Social History Narrative   • Not on file     Social Determinants of Health     Financial Resource Strain:    • Difficulty of Paying Living Expenses:    Food Insecurity:    • Worried About Running Out of Food in the Last Year:    • Ran Out of Food in the Last Year:    Transportation Needs:    • Lack of Transportation (Medical):    • Lack of Transportation (Non-Medical):    Physical Activity:    • Days of Exercise per Week:    • Minutes of Exercise per Session:    Stress:    • Feeling of Stress :    Social Connections:    • Frequency of Communication with Friends and Family:    • Frequency of Social Gatherings with Friends and Family:    • Attends [employer] Services:    • Active Member of Clubs or Organizations:    • Attends Club or Organization Meetings:    • Marital Status:    Intimate Partner Violence:    • Fear of Current or Ex-Partner:    • Emotionally Abused:    • Physically Abused:    • Sexually Abused:        Family History   Problem Relation Age of Onset   • Cancer Mother 45        breast cancer    • Hypertension Father         Objective:   Vital measurements:  /62 (BP Location: Right arm, Patient Position: Sitting)   Wt 49.9 kg (110 lb)   Body mass index is 17.23 kg/m². (Goal BM I>18 <25)    Physical Exam   Nursing note and vitals reviewed.  Constitutional: She is oriented to person, place, and time. She appears well-developed and well-nourished. No distress.     HEENT:   Head: Normocephalic and atraumatic.    Right Ear: External ear normal.   Left Ear: External ear normal.   Nose: Nose normal.   Eyes: Conjunctivae and EOM are normal. Pupils are equal, round, and reactive to light. No scleral icterus.     Neck: Normal range of motion. Neck supple. No tracheal deviation present. No thyromegaly present. No cervical or supraclavicular lymphadenopathy.    Pulmonary/Chest: Effort normal and breath sounds normal. No respiratory distress. She has no wheezes. She has no rales. She exhibits no tenderness.     Cardiovascular: Regular, rate and rhythm. No edema.    Breast: Symmetrical, normal consistency without masses., No dimpling or skin changes.  Bilateral breasts with fibrocystic changes    Abdominal: Soft. Bowel sounds are normal. She exhibits no distension and no mass. No tenderness. She has no rebound and no guarding.     Genitourinary:  Pelvic exam was performed with patient supine.  External genitalia with no abnormal pigmentation, labial fusion, rashes, tenderness, lesions or injury to the labia bilaterally.  BUS normal  Vagina is pink and moist with no lesions, foul discharge, erythema, tenderness or bleeding. No foreign body around the vagina or signs of injury.   Cervix exhibits no motion tenderness, no discharge and no friability, no lesions.   Uterus is small not deviated, not enlarged, not fixed and not tender.  Right adnexa displays no mass, no tenderness and no fullness.  Left adnexa displays no mass, no tenderness and no fullness.     Musculoskeletal: Normal range of motion. Non tender. She exhibits no edema and no tenderness.     Lymphadenopathy: She has no cervical or supraclavicular adenopathy.     Neurological: She is alert and oriented to person, place, and time. She exhibits normal muscle tone.     Skin: Skin is warm and dry. No rash noted. She is not diaphoretic. No erythema. No pallor.     Psychiatric: She has a normal mood and affect. Her behavior is normal. Judgment and thought content normal.      Pelvic and breast exams chaperoned by MA.     Assessment:     1. Well woman exam with routine gynecological exam  THINPREP RFLX HPV ASCUS W/CTNG    CANCELED: REFERRAL TO INFERTILITY   2. Family history of cancer in mother  REFERRAL TO GENETICS       Plan:     #Well Woman  - Pap and physical exam performed; discussed pap smear screening guidelines  - Self breast awareness discussed.  - Encouraged exercise and proper diet.  - Mammograms starting @ age 40 annually.  - See medications and orders placed in encounter report.  - Follow up in 1 year for annual exam or sooner as needed    #Infertility.  -Patient offered a referral to [facility] however she currently declines as she wants to get healthier first.  -I advised her that one of the better thing she can do for her cycles is to gain some weight as she is currently underweight at a BMI of 17.  -I advised that with PCOS that she would need ovulation induction and with her  having a history of low sperm count, he may need medications to boost his sperm count or IUI with semen washing.  -Advised patient to have  follow-up with urologist in the future.  -Patient denies ever having an HSG or saline sonogram before in the past.  Advised that [facility] would likely need to do some sort of test to ensure tubal patency prior to doing IUI.    #Depression and anxiety  Patient was started on citalopram by her primary care physician.  She takes trazodone as needed sleep.  She also is seeing a therapist for talk therapy.  Discussed that usually we recommend SSRI continuation during pregnancy as maternal mental health outweighs the risks of  withdrawal syndrome.    Patient was seen for 35 minutes of which > 50% of appointment time was spent on face-to-face counseling and coordination of care regarding the above.           no

## 2021-03-08 ENCOUNTER — TELEPHONE (OUTPATIENT)
Dept: PHYSICAL THERAPY | Facility: REHABILITATION | Age: 31
End: 2021-03-08

## 2021-03-08 NOTE — OP THERAPY DISCHARGE SUMMARY
Outpatient Physical Therapy  DISCHARGE SUMMARY NOTE      [facility]  [address], [address]  [place] [address]  Phone:  [phone]  Fax:  [phone]    Date of Visit: 03/08/2021    Patient: Stacia Lindsey  YOB: 1990  MRN: [id number]     Referring Provider: Rosalio Barry M.D.   Referring Diagnosis Acute pain of left knee [M25.562]         Functional Assessment Used        Your patient is being discharged from Physical Therapy with the following comments:   · Goals not met    Comments:  Pt has a HDHP and has opted to trial independence with HEP. She has been instructed to f/u within 30 days but she did not. She has been discharged from physical therapy.     Limitations Remaining:  unknown    Recommendations:  F/u with PCP    Gianluca Brice, PT, DPT    Date: 3/8/2021

## 2021-03-16 ENCOUNTER — TELEPHONE (OUTPATIENT)
Dept: OBGYN | Facility: CLINIC | Age: 31
End: 2021-03-16

## 2021-03-16 NOTE — TELEPHONE ENCOUNTER
Harlan from [employer] called stating pt has a kit for genetic DNA testing and they need to know how it is going to be coded and they need prior auth. Notified Harlan that I did not see an order from our end about a kit for genetic DNA, states she will check if another provider gave it to her.

## 2021-04-06 ENCOUNTER — OFFICE VISIT (OUTPATIENT)
Dept: MEDICAL GROUP | Age: 31
End: 2021-04-06
Payer: COMMERCIAL

## 2021-04-06 VITALS
DIASTOLIC BLOOD PRESSURE: 60 MMHG | WEIGHT: 111 LBS | TEMPERATURE: 99.1 F | HEART RATE: 82 BPM | BODY MASS INDEX: 17.42 KG/M2 | OXYGEN SATURATION: 98 % | RESPIRATION RATE: 14 BRPM | SYSTOLIC BLOOD PRESSURE: 90 MMHG | HEIGHT: 67 IN

## 2021-04-06 DIAGNOSIS — F51.04 PSYCHOPHYSIOLOGICAL INSOMNIA: ICD-10-CM

## 2021-04-06 DIAGNOSIS — F41.8 DEPRESSION WITH ANXIETY: Primary | ICD-10-CM

## 2021-04-06 DIAGNOSIS — J02.9 PHARYNGITIS, UNSPECIFIED ETIOLOGY: ICD-10-CM

## 2021-04-06 PROCEDURE — 99214 OFFICE O/P EST MOD 30 MIN: CPT | Performed by: FAMILY MEDICINE

## 2021-04-06 ASSESSMENT — FIBROSIS 4 INDEX: FIB4 SCORE: 0.42

## 2021-04-07 NOTE — PROGRESS NOTES
Subjective:   CC: sore throat    HPI:     Stacia Lindsey is a 30 y.o. female, established patient of the clinic, presents with the following concerns:     Patient was in her normal state of health until approximately 1 weeks ago when she developed acute erythematous and sore throat with whitish deposits on tonsils and posterior throats. She found some tonsillar stones on the left side of her tonsil. She also c/o right ear popping, discomfort, tender ear canal with sensation of foreign body in the right ear canal. She feels mild paresthesia at the right gum, cheek, and the right side of her scalp as well. She took vitamin C and was gargling her throat with salt water intermittently. Her symptoms have now completely resolved.     She had similar in the past, all resolved spontaneously. She is attending college online. She lives with her . She rarely exposes to general public.     She has chronic REENA and insomnia. Her symptoms are controlled with medication. She takes Celexa 20 mg qd. She also takes 0.25 tablet of Trazodone 50 mg qd as needed for sleep. She is doing well with both medication and is thinking of discontinue medications in the summer. She continues to work with counseling every 2 weeks.         Current medicines (including changes today)  Current Outpatient Medications   Medication Sig Dispense Refill   • traZODone (DESYREL) 50 MG Tab Take 1 Tab by mouth at bedtime as needed for Sleep. 90 Tab 3   • citalopram (CELEXA) 20 MG Tab Take 1 Tab by mouth every day. 90 Tab 3     No current facility-administered medications for this visit.     She  has a past medical history of Anxiety, Depression, and Lactose intolerance. She also has no past medical history of Addisons disease ([facility]), Adrenal disorder ([facility]), Allergy, Anemia, Arrhythmia, Arthritis, Asthma, Blood transfusion without reported diagnosis, Cancer ([facility]), Cataract, CHF (congestive heart failure) ([facility]), Clotting disorder ([facility]), COPD (chronic  "obstructive pulmonary disease) (HCC), Cushings syndrome (HCC), Diabetes (HCC), Diabetic neuropathy (HCC), GERD (gastroesophageal reflux disease), Glaucoma, Goiter, Head ache, Heart attack (HCC), Heart murmur, HIV (human immunodeficiency virus infection) (HCC), Hyperlipidemia, Hypertension, IBD (inflammatory bowel disease), Kidney disease, Meningitis, Migraine, Muscle disorder, Osteoporosis, Parathyroid disorder (HCC), Pituitary disease (HCC), Pulmonary emphysema (HCC), Seizure (HCC), Sickle cell disease (HCC), Stroke (HCC), Substance abuse (HCC), Thyroid disease, Tuberculosis, or Urinary tract infection.    I personally reviewed patient's problem list, allergies, medications, family hx, social hx with patient and update EPIC.     REVIEW OF SYSTEMS:  CONSTITUTIONAL:  Denies night sweats, fatigue, malaise, lethargy, fever or chills.  RESPIRATORY:  Denies cough, wheeze, hemoptysis, or shortness of breath.  CARDIOVASCULAR:  Denies chest pains, palpitations, pedal edema     Objective:     BP (!) 90/60 (BP Location: Right arm, Patient Position: Sitting, BP Cuff Size: Adult)   Pulse 82   Temp 37.3 °C (99.1 °F) (Temporal)   Resp 14   Ht 1.702 m (5' 7\")   Wt 50.3 kg (111 lb)   SpO2 98%  Body mass index is 17.39 kg/m².    Physical Exam:  Constitutional: awake, alert, in no distress.  Skin: Warm, dry, good turgor, no rashes, bruises, ulcers in visible areas.  Eye: conjunctiva clear, lids neg for edema or lesions.  ENMT: TM and auditory canals wnl. Oral and nasal mucosa wnl. Lips without lesions, good dentition, oropharynx clear.  Neck: Trachea midline, no masses, no thyromegaly. No cervical or supraclavicular lymphadenopathy  Respiratory: Unlabored respiratory effort, lungs clear to auscultation, no wheezes, no rales.  Cardiovascular: Normal S1, S2, no murmur, no pedal edema.  Psych: Oriented x3, affect and mood wnl, intact judgement and insight.       Assessment and Plan:   The following treatment plan was " discussed    1. Psychophysiological insomnia  Chronic, take 0.25 tablet of Trazodone 50 mg qd nightly for sleep.   She tolerates medication well, no s/e reported, will cont.     2. Depression with anxiety  Controlled with Citalopram 20 mg qd, no s/e reported.   She works with  every 2 weeks.   She is planning to start a trial of discontinuation of Citalopram in the summer.   Weaning schedule discussed. Avoid quitting medication cold turkey.     3. Pharyngitis, unspecified etiology  Acute pharyngitis and right ear pain about 7 days ago  All symptoms have resolved spontaneously  ENT exam wnl today, reassurance provided.        Brisa Teixeira M.D.      Followup: Return for As needed.    Please note that this dictation was created using voice recognition software. I have made every reasonable attempt to correct obvious errors, but I expect that there are errors of grammar and possibly content that I did not discover before finalizing the note.

## 2021-04-21 ENCOUNTER — IMMUNIZATION (OUTPATIENT)
Dept: FAMILY PLANNING/WOMEN'S HEALTH CLINIC | Facility: IMMUNIZATION CENTER | Age: 31
End: 2021-04-21
Payer: COMMERCIAL

## 2021-04-21 DIAGNOSIS — Z23 ENCOUNTER FOR VACCINATION: Primary | ICD-10-CM

## 2021-04-21 PROCEDURE — 91300 PFIZER SARS-COV-2 VACCINE: CPT

## 2021-04-21 PROCEDURE — 0001A PFIZER SARS-COV-2 VACCINE: CPT

## 2021-05-06 ENCOUNTER — TELEMEDICINE (OUTPATIENT)
Dept: MEDICAL GROUP | Age: 31
End: 2021-05-06
Payer: COMMERCIAL

## 2021-05-06 VITALS — WEIGHT: 113 LBS | BODY MASS INDEX: 17.74 KG/M2 | HEIGHT: 67 IN

## 2021-05-06 DIAGNOSIS — F41.8 DEPRESSION WITH ANXIETY: Primary | ICD-10-CM

## 2021-05-06 DIAGNOSIS — N89.8 VAGINAL IRRITATION: ICD-10-CM

## 2021-05-06 DIAGNOSIS — R82.90 ABNORMAL URINE ODOR: ICD-10-CM

## 2021-05-06 DIAGNOSIS — F51.04 PSYCHOPHYSIOLOGICAL INSOMNIA: ICD-10-CM

## 2021-05-06 PROCEDURE — 99213 OFFICE O/P EST LOW 20 MIN: CPT | Mod: 95 | Performed by: FAMILY MEDICINE

## 2021-05-06 ASSESSMENT — FIBROSIS 4 INDEX: FIB4 SCORE: 0.42

## 2021-05-06 NOTE — PROGRESS NOTES
Virtual Visit: Established Patient   This visit was conducted via Zoom using secure and encrypted videoconferencing technology. The patient was in a private location in the state of [place].    The patient's identity was confirmed and verbal consent was obtained for this virtual visit.    Subjective:   CC: depression, anxiety     Stacia Lindsey is a 30 y.o. female with chronic depression, anxiety, and insomnia controlled with Celexa, Trazodone. She also sees counseling once every 2 weeks. She has not had panic attacks for several months. She is pleased with the outcome of the treatment.     She also complained of dark, foul odor urine, and vaginal irritation, first noticed in the morning and after exercise. Symptoms usually goes away during the day. She is sexually active. Denies urinary urgency/frequency.     ROS   Denies any recent fevers or chills. No nausea or vomiting. No chest pains or shortness of breath.     Allergies   Allergen Reactions   • Penicillins Rash     Rash on the skin       Current medicines (including changes today)  Current Outpatient Medications   Medication Sig Dispense Refill   • traZODone (DESYREL) 50 MG Tab Take 1 Tab by mouth at bedtime as needed for Sleep. 90 Tab 3   • citalopram (CELEXA) 20 MG Tab Take 1 Tab by mouth every day. 90 Tab 3     No current facility-administered medications for this visit.       Patient Active Problem List    Diagnosis Date Noted   • Cervical paraspinal muscle spasm 11/23/2020   • Psychophysiological insomnia 09/08/2020   • History of Helicobacter pylori infection 09/04/2020   • Depression with anxiety 09/04/2020   • Female fertility problem 08/20/2020   • PCOS (polycystic ovarian syndrome) 08/18/2020   • Lactose intolerance 12/21/2016       Family History   Problem Relation Age of Onset   • Cancer Mother 45        breast cancer    • Hypertension Father        She  has a past medical history of Anxiety, Depression, and Lactose intolerance. She also has  "no past medical history of Addisons disease (HCC), Adrenal disorder (HCC), Allergy, Anemia, Arrhythmia, Arthritis, Asthma, Blood transfusion without reported diagnosis, Cancer ([facility]), Cataract, CHF (congestive heart failure) (HCC), Clotting disorder (HCC), COPD (chronic obstructive pulmonary disease) (HCC), Cushings syndrome (HCC), Diabetes (HCC), Diabetic neuropathy (HCC), GERD (gastroesophageal reflux disease), Glaucoma, Goiter, Head ache, Heart attack (HCC), Heart murmur, HIV (human immunodeficiency virus infection) ([facility]), Hyperlipidemia, Hypertension, IBD (inflammatory bowel disease), Kidney disease, Meningitis, Migraine, Muscle disorder, Osteoporosis, Parathyroid disorder (HCC), Pituitary disease (HCC), Pulmonary emphysema (HCC), Seizure (HCC), Sickle cell disease (HCC), Stroke ([facility]), Substance abuse ([facility]), Thyroid disease, Tuberculosis, or Urinary tract infection.  She  has no past surgical history on file.       Objective:   Ht 1.702 m (5' 7\")   Wt 51.3 kg (113 lb) Comment: pt stated  BMI 17.70 kg/m²     Physical Exam:  Constitutional: Alert, no distress, well-groomed.  Skin: No rashes in visible areas.  Eye: Round. Conjunctiva clear, lids normal. No icterus.   ENMT: Lips pink without lesions, good dentition, moist mucous membranes. Phonation normal.  Neck: No masses, no thyromegaly. Moves freely without pain.  Respiratory: Unlabored respiratory effort, no cough or audible wheeze  Psych: Alert and oriented x3, normal affect and mood.       Assessment and Plan:   The following treatment plan was discussed:     1. Depression with anxiety  Chronic, controlled with Celexa 20 mg qd and counseling every 2 weeks, no s/e reported, will continue.      2. Psychophysiological insomnia  Chronic, controlled with Trazodone PRN, no s/e reported, will continue.    Sleep hygiene     3. Vaginal irritation  4. Abnormal urine odor  Acute, mild dark/foul odor urine and vaginal irritation first noticed in the morning and after " exercises. Symptoms resolve during the day. No other systemic or urogenital symptoms. Discussed potential etiologies. Encouraged increased hydration. Offered testing/treatment, but pt prefers to monitor her symptoms and email me if symptoms fail to improve.       Follow-up: Return in about 6 months (around 11/6/2021) for Multiple issues.

## 2021-05-10 ENCOUNTER — PATIENT MESSAGE (OUTPATIENT)
Dept: MEDICAL GROUP | Age: 31
End: 2021-05-10

## 2021-05-10 DIAGNOSIS — R30.0 DYSURIA: ICD-10-CM

## 2021-05-10 RX ORDER — SULFAMETHOXAZOLE AND TRIMETHOPRIM 800; 160 MG/1; MG/1
1 TABLET ORAL 2 TIMES DAILY
Qty: 6 TABLET | Refills: 0 | Status: SHIPPED | OUTPATIENT
Start: 2021-05-10 | End: 2021-05-13

## 2021-05-20 ENCOUNTER — IMMUNIZATION (OUTPATIENT)
Dept: FAMILY PLANNING/WOMEN'S HEALTH CLINIC | Facility: IMMUNIZATION CENTER | Age: 31
End: 2021-05-20
Payer: COMMERCIAL

## 2021-05-20 DIAGNOSIS — Z23 ENCOUNTER FOR VACCINATION: Primary | ICD-10-CM

## 2021-05-20 PROCEDURE — 0002A PFIZER SARS-COV-2 VACCINE: CPT

## 2021-05-20 PROCEDURE — 91300 PFIZER SARS-COV-2 VACCINE: CPT

## 2021-09-20 ENCOUNTER — PATIENT MESSAGE (OUTPATIENT)
Dept: MEDICAL GROUP | Age: 31
End: 2021-09-20

## 2021-09-20 DIAGNOSIS — Z00.00 PE (PHYSICAL EXAM), ANNUAL: ICD-10-CM

## 2021-09-27 ENCOUNTER — HOSPITAL ENCOUNTER (OUTPATIENT)
Dept: LAB | Facility: MEDICAL CENTER | Age: 31
End: 2021-09-27
Attending: FAMILY MEDICINE
Payer: COMMERCIAL

## 2021-09-27 DIAGNOSIS — Z00.00 PE (PHYSICAL EXAM), ANNUAL: ICD-10-CM

## 2021-09-27 LAB
ALBUMIN SERPL BCP-MCNC: 4.7 G/DL (ref 3.2–4.9)
ALBUMIN/GLOB SERPL: 1.6 G/DL
ALP SERPL-CCNC: 48 U/L (ref 30–99)
ALT SERPL-CCNC: 9 U/L (ref 2–50)
ANION GAP SERPL CALC-SCNC: 10 MMOL/L (ref 7–16)
AST SERPL-CCNC: 6 U/L (ref 12–45)
BASOPHILS # BLD AUTO: 0.9 % (ref 0–1.8)
BASOPHILS # BLD: 0.08 K/UL (ref 0–0.12)
BILIRUB SERPL-MCNC: 0.5 MG/DL (ref 0.1–1.5)
BUN SERPL-MCNC: 11 MG/DL (ref 8–22)
CALCIUM SERPL-MCNC: 9.9 MG/DL (ref 8.5–10.5)
CHLORIDE SERPL-SCNC: 104 MMOL/L (ref 96–112)
CHOLEST SERPL-MCNC: 160 MG/DL (ref 100–199)
CO2 SERPL-SCNC: 25 MMOL/L (ref 20–33)
CREAT SERPL-MCNC: 0.59 MG/DL (ref 0.5–1.4)
EOSINOPHIL # BLD AUTO: 0.12 K/UL (ref 0–0.51)
EOSINOPHIL NFR BLD: 1.4 % (ref 0–6.9)
ERYTHROCYTE [DISTWIDTH] IN BLOOD BY AUTOMATED COUNT: 41.8 FL (ref 35.9–50)
EST. AVERAGE GLUCOSE BLD GHB EST-MCNC: 100 MG/DL
FASTING STATUS PATIENT QL REPORTED: NORMAL
GLOBULIN SER CALC-MCNC: 2.9 G/DL (ref 1.9–3.5)
GLUCOSE SERPL-MCNC: 69 MG/DL (ref 65–99)
HBA1C MFR BLD: 5.1 % (ref 4–5.6)
HCT VFR BLD AUTO: 40.3 % (ref 37–47)
HDLC SERPL-MCNC: 77 MG/DL
HGB BLD-MCNC: 13.3 G/DL (ref 12–16)
IMM GRANULOCYTES # BLD AUTO: 0.02 K/UL (ref 0–0.11)
IMM GRANULOCYTES NFR BLD AUTO: 0.2 % (ref 0–0.9)
LDLC SERPL CALC-MCNC: 76 MG/DL
LYMPHOCYTES # BLD AUTO: 2.54 K/UL (ref 1–4.8)
LYMPHOCYTES NFR BLD: 29.9 % (ref 22–41)
MCH RBC QN AUTO: 30.3 PG (ref 27–33)
MCHC RBC AUTO-ENTMCNC: 33 G/DL (ref 33.6–35)
MCV RBC AUTO: 91.8 FL (ref 81.4–97.8)
MONOCYTES # BLD AUTO: 0.91 K/UL (ref 0–0.85)
MONOCYTES NFR BLD AUTO: 10.7 % (ref 0–13.4)
NEUTROPHILS # BLD AUTO: 4.83 K/UL (ref 2–7.15)
NEUTROPHILS NFR BLD: 56.9 % (ref 44–72)
NRBC # BLD AUTO: 0 K/UL
NRBC BLD-RTO: 0 /100 WBC
PLATELET # BLD AUTO: 241 K/UL (ref 164–446)
PMV BLD AUTO: 9.9 FL (ref 9–12.9)
POTASSIUM SERPL-SCNC: 4.2 MMOL/L (ref 3.6–5.5)
PROT SERPL-MCNC: 7.6 G/DL (ref 6–8.2)
RBC # BLD AUTO: 4.39 M/UL (ref 4.2–5.4)
SODIUM SERPL-SCNC: 139 MMOL/L (ref 135–145)
TRIGL SERPL-MCNC: 37 MG/DL (ref 0–149)
WBC # BLD AUTO: 8.5 K/UL (ref 4.8–10.8)

## 2021-09-27 PROCEDURE — 83036 HEMOGLOBIN GLYCOSYLATED A1C: CPT

## 2021-09-27 PROCEDURE — 85025 COMPLETE CBC W/AUTO DIFF WBC: CPT

## 2021-09-27 PROCEDURE — 80061 LIPID PANEL: CPT

## 2021-09-27 PROCEDURE — 80053 COMPREHEN METABOLIC PANEL: CPT

## 2021-09-27 PROCEDURE — 36415 COLL VENOUS BLD VENIPUNCTURE: CPT

## 2021-10-22 ENCOUNTER — OFFICE VISIT (OUTPATIENT)
Dept: MEDICAL GROUP | Age: 31
End: 2021-10-22
Payer: COMMERCIAL

## 2021-10-22 VITALS
OXYGEN SATURATION: 94 % | HEART RATE: 61 BPM | SYSTOLIC BLOOD PRESSURE: 100 MMHG | WEIGHT: 120 LBS | DIASTOLIC BLOOD PRESSURE: 52 MMHG | HEIGHT: 67 IN | BODY MASS INDEX: 18.83 KG/M2 | TEMPERATURE: 98.2 F

## 2021-10-22 DIAGNOSIS — F41.8 DEPRESSION WITH ANXIETY: ICD-10-CM

## 2021-10-22 DIAGNOSIS — E73.9 LACTOSE INTOLERANCE: ICD-10-CM

## 2021-10-22 DIAGNOSIS — Z23 NEED FOR VACCINATION: ICD-10-CM

## 2021-10-22 DIAGNOSIS — N97.9 FEMALE FERTILITY PROBLEM: ICD-10-CM

## 2021-10-22 DIAGNOSIS — F51.04 PSYCHOPHYSIOLOGICAL INSOMNIA: ICD-10-CM

## 2021-10-22 DIAGNOSIS — E28.2 PCOS (POLYCYSTIC OVARIAN SYNDROME): ICD-10-CM

## 2021-10-22 DIAGNOSIS — Z80.3 FHX: BREAST CANCER: ICD-10-CM

## 2021-10-22 DIAGNOSIS — Z00.00 PE (PHYSICAL EXAM), ANNUAL: ICD-10-CM

## 2021-10-22 PROBLEM — M62.838 CERVICAL PARASPINAL MUSCLE SPASM: Status: RESOLVED | Noted: 2020-11-23 | Resolved: 2021-10-22

## 2021-10-22 PROCEDURE — 90715 TDAP VACCINE 7 YRS/> IM: CPT | Performed by: FAMILY MEDICINE

## 2021-10-22 PROCEDURE — 99395 PREV VISIT EST AGE 18-39: CPT | Mod: 25 | Performed by: FAMILY MEDICINE

## 2021-10-22 PROCEDURE — 90471 IMMUNIZATION ADMIN: CPT | Performed by: FAMILY MEDICINE

## 2021-10-22 RX ORDER — ALPRAZOLAM 0.25 MG/1
0.25 TABLET ORAL NIGHTLY PRN
Qty: 30 TABLET | Refills: 0 | Status: SHIPPED | OUTPATIENT
Start: 2021-10-22 | End: 2021-11-21

## 2021-10-22 RX ORDER — CITALOPRAM HYDROBROMIDE 10 MG/1
TABLET ORAL
Qty: 30 TABLET | Refills: 0 | Status: SHIPPED | OUTPATIENT
Start: 2021-10-22 | End: 2022-02-08 | Stop reason: SDUPTHER

## 2021-10-22 ASSESSMENT — FIBROSIS 4 INDEX: FIB4 SCORE: 0.24

## 2021-10-24 NOTE — PROGRESS NOTES
Subjective:   CC: Annual physical    HPI:     Stacia Lindsey is a 31 y.o. female, established patient of the clinic.     Patient has chronic depression with anxiety.  She is currently taking citalopram 20 mg.  She is also working with behavioral counseling.  Her symptoms are under good control.  She rarely needs Xanax for acute panic attacks.  Patient wants to try to wean off citalopram.  She is hoping to be able to control her symptoms with the medication.  Patient also suffers chronic psychophysiological insomnia.  She is taking a quarter of trazodone 50 mg nightly as needed for sleep.  She is doing relatively well.  She does not have any side effects with either citalopram or trazodone.  Negative suicidal ideation or plans.  She is active and try to exercise regularly.  Negative history of drugs, alcohol, tobacco abuse.    Patient has chronic PCOS leading to fertility problems.  She was previously working with local OB/GYN, but did not complete work-up due to worsening mental health.  She is interested in referral to fertility specialist as her  and patient are renewing their interest in having children now that her mental health is stable.    She has lactose intolerance which is controlled with dietary modification.    Her mother  from breast cancer at the age of 45.  Patient was referred to genetic counseling by OB/GYN, unfortunately, her insurance did not cover the genetic tests.  She did have genetic study done with [employer], but did not hear back from them. So, she assumes that she has negative BRCA1/2.  She denies any active breast symptoms.     Current medicines (including changes today)  Current Outpatient Medications   Medication Sig Dispense Refill   • citalopram (CELEXA) 10 MG tablet Take 1 tablet daily for 2 weeks, then reduce to 1 tablet every other day for 2 weeks, then reduces to 2 times per week for 2 weeks, then discontinue there after. 30 Tablet 0   • ALPRAZolam (XANAX) 0.25  "MG Tab Take 1 Tablet by mouth at bedtime as needed for Anxiety for up to 30 days. 30 Tablet 0   • traZODone (DESYREL) 50 MG Tab Take 1 Tab by mouth at bedtime as needed for Sleep. 90 Tab 3     No current facility-administered medications for this visit.     She  has a past medical history of Anxiety, Depression, and Lactose intolerance. She also has no past medical history of Addisons disease ([facility]), Adrenal disorder ([facility]), Allergy, Anemia, Arrhythmia, Arthritis, Asthma, Blood transfusion without reported diagnosis, Cancer ([facility]), Cataract, CHF (congestive heart failure) ([facility]), Clotting disorder ([facility]), COPD (chronic obstructive pulmonary disease) ([facility]), Cushings syndrome ([facility]), Diabetes ([facility]), Diabetic neuropathy ([facility]), GERD (gastroesophageal reflux disease), Glaucoma, Goiter, Head ache, Heart attack ([facility]), Heart murmur, HIV (human immunodeficiency virus infection) ([facility]), Hyperlipidemia, Hypertension, IBD (inflammatory bowel disease), Kidney disease, Meningitis, Migraine, Muscle disorder, Osteoporosis, Parathyroid disorder ([facility]), Pituitary disease ([facility]), Pulmonary emphysema ([facility]), Seizure ([facility]), Sickle cell disease ([facility]), Stroke ([facility]), Substance abuse ([facility]), Thyroid disease, Tuberculosis, or Urinary tract infection.    I personally reviewed patient's problem list, allergies, medications, family hx, social hx with patient and update EPIC.     REVIEW OF SYSTEMS:  CONSTITUTIONAL:  Denies night sweats, fatigue, malaise, lethargy, fever or chills.  RESPIRATORY:  Denies cough, wheeze, hemoptysis, or shortness of breath.  CARDIOVASCULAR:  Denies chest pains, palpitations, pedal edema     Objective:     /52 (BP Location: Right arm, Patient Position: Sitting, BP Cuff Size: Adult)   Pulse 61   Temp 36.8 °C (98.2 °F) (Temporal)   Ht 1.702 m (5' 7\")   Wt 54.4 kg (120 lb)   SpO2 94%  Body mass index is 18.79 kg/m².    Physical Exam:  Constitutional: awake, alert, in no distress.  Skin: Warm, dry, good turgor, no rashes, " bruises, ulcers in visible areas.  Eye: conjunctiva clear, lids neg for edema or lesions.  Neck: Trachea midline, no masses, no thyromegaly. No cervical or supraclavicular lymphadenopathy  Respiratory: Unlabored respiratory effort, lungs clear to auscultation, no wheezes, no rales.  Cardiovascular: Normal S1, S2, no murmur, no pedal edema.  Abdomen: Soft, non-tender to palpation, active BS, no hernia, no hepatosplenomegaly, negative rebound or guarding.   Psych: Oriented x3, affect and mood wnl, intact judgement and insight.       Assessment and Plan:   The following treatment plan was discussed    1. Depression with anxiety  Chronic, controlled with Citalopram 20 mg qd. She also works with . She rarely needs Xanax for panic attacks. She is considering getting pregnant. She is interested in weaning off Citalopram and trying to control her symptoms with counseling and lifestyle/behavioral modifications.   - citalopram (CELEXA) 10 MG tablet; Take 1 tablet daily for 2 weeks, then reduce to 1 tablet every other day for 2 weeks, then reduces to 2 times per week for 2 weeks, then discontinue there after.  Dispense: 30 Tablet; Refill: 0  - ALPRAZolam (XANAX) 0.25 MG Tab; Take 1 Tablet by mouth at bedtime as needed for Anxiety for up to 30 days.  Dispense: 30 Tablet; Refill: 0  - continue counseling   - regularly exercises  - avoid drugs, alcohol, tobacco    2. Psychophysiological insomnia  Controlled with 1/4 of Trazodone 50 mg qd PRN for sleep   No side effects reported, will continue     3. PCOS (polycystic ovarian syndrome)  4. Female fertility problem  - REFERRAL TO INFERTILITY    5. Lactose intolerance  Controlled with diet modification.     6. FHx: breast cancer  Her mother  from breast cancer at the age of 45.  Patient was referred to genetic counseling by OB/GYN, unfortunately, her insurance did not cover the genetic tests.  She did have genetic study done with [employer], but did not hear back from them. So,  she assumes that she has negative BRCA1/2.  She denies any active breast symptoms.   - referred to genetics for consultation.     7. Need for vaccination  - Tdap Vaccine =>6YO IM    8. PE (physical exam), annual  General health and wellness counseling provided.          Brisa Teixeira M.D.      Followup: Return in about 1 year (around 10/22/2022) for annual PE.    Please note that this dictation was created using voice recognition software. I have made every reasonable attempt to correct obvious errors, but I expect that there are errors of grammar and possibly content that I did not discover before finalizing the note.

## 2022-02-08 ENCOUNTER — PATIENT MESSAGE (OUTPATIENT)
Dept: MEDICAL GROUP | Age: 32
End: 2022-02-08

## 2022-02-08 DIAGNOSIS — F41.8 DEPRESSION WITH ANXIETY: ICD-10-CM

## 2022-02-08 RX ORDER — CITALOPRAM HYDROBROMIDE 10 MG/1
TABLET ORAL
Qty: 30 TABLET | Refills: 0 | Status: SHIPPED | OUTPATIENT
Start: 2022-02-08 | End: 2022-02-11 | Stop reason: SDUPTHER

## 2022-02-08 NOTE — TELEPHONE ENCOUNTER
From: Stacia Lindsey  To: Physician Brisa Teixeira  Sent: 2/8/2022 2:45 PM PST  Subject: Antidepressant refill    Dr. Puneet Kirby!  Please update my refill information about citalopram 20mg. I keep taking it as prescribed and I think won’t stop any soon because don’t feel like it is time to do it.   Thank you  Regards Elzbieta

## 2022-02-11 ENCOUNTER — PATIENT MESSAGE (OUTPATIENT)
Dept: MEDICAL GROUP | Age: 32
End: 2022-02-11

## 2022-02-11 DIAGNOSIS — F41.8 DEPRESSION WITH ANXIETY: ICD-10-CM

## 2022-02-11 NOTE — TELEPHONE ENCOUNTER
From: Stacia Lindsey  To: Physician Brisa Teixeira  Sent: 2022 2:34 PM PST  Subject: Citalopram 20mg refill update    Dr. Puneet Kirby!  Could you please update in my medications Citalopram 20 mg. I have prescription for 10 mg but I didn’t lower my dose to 10 mg yet I keep taking 20 mg. My refill information is  and need your approve.   Thank you   Regards Elzbieta

## 2022-02-14 RX ORDER — CITALOPRAM HYDROBROMIDE 10 MG/1
TABLET ORAL
Qty: 30 TABLET | Refills: 0 | Status: SHIPPED | OUTPATIENT
Start: 2022-02-14 | End: 2022-03-01 | Stop reason: SDUPTHER

## 2022-02-15 ENCOUNTER — PATIENT MESSAGE (OUTPATIENT)
Dept: MEDICAL GROUP | Age: 32
End: 2022-02-15
Payer: COMMERCIAL

## 2022-02-15 DIAGNOSIS — F41.8 DEPRESSION WITH ANXIETY: ICD-10-CM

## 2022-03-01 RX ORDER — CITALOPRAM 20 MG/1
20 TABLET ORAL DAILY
Qty: 90 TABLET | Refills: 3 | Status: SHIPPED
Start: 2022-03-01 | End: 2022-10-21

## 2022-09-06 ENCOUNTER — PATIENT MESSAGE (OUTPATIENT)
Dept: MEDICAL GROUP | Age: 32
End: 2022-09-06
Payer: COMMERCIAL

## 2022-09-06 DIAGNOSIS — Z00.00 PE (PHYSICAL EXAM), ANNUAL: ICD-10-CM

## 2022-09-16 ENCOUNTER — HOSPITAL ENCOUNTER (OUTPATIENT)
Dept: LAB | Facility: MEDICAL CENTER | Age: 32
End: 2022-09-16
Attending: FAMILY MEDICINE
Payer: COMMERCIAL

## 2022-09-16 DIAGNOSIS — Z00.00 PE (PHYSICAL EXAM), ANNUAL: ICD-10-CM

## 2022-09-16 LAB
ALBUMIN SERPL BCP-MCNC: 4.5 G/DL (ref 3.2–4.9)
ALBUMIN/GLOB SERPL: 1.7 G/DL
ALP SERPL-CCNC: 45 U/L (ref 30–99)
ALT SERPL-CCNC: 6 U/L (ref 2–50)
ANION GAP SERPL CALC-SCNC: 11 MMOL/L (ref 7–16)
AST SERPL-CCNC: 14 U/L (ref 12–45)
BASOPHILS # BLD AUTO: 1.4 % (ref 0–1.8)
BASOPHILS # BLD: 0.08 K/UL (ref 0–0.12)
BILIRUB SERPL-MCNC: 0.4 MG/DL (ref 0.1–1.5)
BUN SERPL-MCNC: 12 MG/DL (ref 8–22)
CALCIUM SERPL-MCNC: 9.4 MG/DL (ref 8.5–10.5)
CHLORIDE SERPL-SCNC: 104 MMOL/L (ref 96–112)
CHOLEST SERPL-MCNC: 165 MG/DL (ref 100–199)
CO2 SERPL-SCNC: 23 MMOL/L (ref 20–33)
CREAT SERPL-MCNC: 0.67 MG/DL (ref 0.5–1.4)
EOSINOPHIL # BLD AUTO: 0.17 K/UL (ref 0–0.51)
EOSINOPHIL NFR BLD: 3 % (ref 0–6.9)
ERYTHROCYTE [DISTWIDTH] IN BLOOD BY AUTOMATED COUNT: 44.8 FL (ref 35.9–50)
EST. AVERAGE GLUCOSE BLD GHB EST-MCNC: 97 MG/DL
GFR SERPLBLD CREATININE-BSD FMLA CKD-EPI: 121 ML/MIN/1.73 M 2
GLOBULIN SER CALC-MCNC: 2.6 G/DL (ref 1.9–3.5)
GLUCOSE SERPL-MCNC: 80 MG/DL (ref 65–99)
HBA1C MFR BLD: 5 % (ref 4–5.6)
HCT VFR BLD AUTO: 40 % (ref 37–47)
HDLC SERPL-MCNC: 65 MG/DL
HGB BLD-MCNC: 13 G/DL (ref 12–16)
IMM GRANULOCYTES # BLD AUTO: 0.01 K/UL (ref 0–0.11)
IMM GRANULOCYTES NFR BLD AUTO: 0.2 % (ref 0–0.9)
LDLC SERPL CALC-MCNC: 93 MG/DL
LYMPHOCYTES # BLD AUTO: 2.14 K/UL (ref 1–4.8)
LYMPHOCYTES NFR BLD: 38.4 % (ref 22–41)
MCH RBC QN AUTO: 30.4 PG (ref 27–33)
MCHC RBC AUTO-ENTMCNC: 32.5 G/DL (ref 33.6–35)
MCV RBC AUTO: 93.5 FL (ref 81.4–97.8)
MONOCYTES # BLD AUTO: 0.55 K/UL (ref 0–0.85)
MONOCYTES NFR BLD AUTO: 9.9 % (ref 0–13.4)
NEUTROPHILS # BLD AUTO: 2.63 K/UL (ref 2–7.15)
NEUTROPHILS NFR BLD: 47.1 % (ref 44–72)
NRBC # BLD AUTO: 0 K/UL
NRBC BLD-RTO: 0 /100 WBC
PLATELET # BLD AUTO: 279 K/UL (ref 164–446)
PMV BLD AUTO: 9.5 FL (ref 9–12.9)
POTASSIUM SERPL-SCNC: 4.1 MMOL/L (ref 3.6–5.5)
PROT SERPL-MCNC: 7.1 G/DL (ref 6–8.2)
RBC # BLD AUTO: 4.28 M/UL (ref 4.2–5.4)
SODIUM SERPL-SCNC: 138 MMOL/L (ref 135–145)
TRIGL SERPL-MCNC: 34 MG/DL (ref 0–149)
WBC # BLD AUTO: 5.6 K/UL (ref 4.8–10.8)

## 2022-09-16 PROCEDURE — 83036 HEMOGLOBIN GLYCOSYLATED A1C: CPT

## 2022-09-16 PROCEDURE — 80061 LIPID PANEL: CPT

## 2022-09-16 PROCEDURE — 36415 COLL VENOUS BLD VENIPUNCTURE: CPT

## 2022-09-16 PROCEDURE — 85025 COMPLETE CBC W/AUTO DIFF WBC: CPT

## 2022-09-16 PROCEDURE — 80053 COMPREHEN METABOLIC PANEL: CPT

## 2022-10-21 ENCOUNTER — OFFICE VISIT (OUTPATIENT)
Dept: MEDICAL GROUP | Age: 32
End: 2022-10-21
Payer: COMMERCIAL

## 2022-10-21 VITALS
HEIGHT: 67 IN | WEIGHT: 132 LBS | HEART RATE: 64 BPM | DIASTOLIC BLOOD PRESSURE: 68 MMHG | SYSTOLIC BLOOD PRESSURE: 110 MMHG | OXYGEN SATURATION: 98 % | BODY MASS INDEX: 20.72 KG/M2 | TEMPERATURE: 97 F

## 2022-10-21 DIAGNOSIS — Z23 NEED FOR VACCINATION: ICD-10-CM

## 2022-10-21 DIAGNOSIS — F51.04 PSYCHOPHYSIOLOGICAL INSOMNIA: ICD-10-CM

## 2022-10-21 DIAGNOSIS — N97.9 FEMALE FERTILITY PROBLEM: ICD-10-CM

## 2022-10-21 DIAGNOSIS — E28.2 PCOS (POLYCYSTIC OVARIAN SYNDROME): ICD-10-CM

## 2022-10-21 DIAGNOSIS — E73.9 LACTOSE INTOLERANCE: ICD-10-CM

## 2022-10-21 DIAGNOSIS — Z00.00 PE (PHYSICAL EXAM), ANNUAL: ICD-10-CM

## 2022-10-21 DIAGNOSIS — F41.8 DEPRESSION WITH ANXIETY: ICD-10-CM

## 2022-10-21 PROCEDURE — 99395 PREV VISIT EST AGE 18-39: CPT | Performed by: FAMILY MEDICINE

## 2022-10-21 RX ORDER — ALPRAZOLAM 0.25 MG/1
0.25 TABLET ORAL NIGHTLY PRN
Qty: 30 TABLET | Refills: 0 | Status: SHIPPED | OUTPATIENT
Start: 2022-10-21 | End: 2022-11-20

## 2022-10-21 ASSESSMENT — PATIENT HEALTH QUESTIONNAIRE - PHQ9: CLINICAL INTERPRETATION OF PHQ2 SCORE: 0

## 2022-10-21 ASSESSMENT — FIBROSIS 4 INDEX: FIB4 SCORE: 0.61

## 2022-10-23 NOTE — PROGRESS NOTES
Subjective:   CC: annual physical     HPI:     Stacia Lindsey is a 32 y.o. female, established patient of the clinic.     Patient is doing well. She stopped taking Citalopram a few weeks ago. She did have mild withdrawal symptoms, but not severe. She takes Trazodone as needed for sleep. She is doing well mentally. She used to work with , but rarely needs consultation at this time. She is attending college.     She is , , sexually active, trying to conceive. She has known PCOS and infertility problem in the past and used to work with fertility specialist. Her menstruation is regular every 28 days with normal flow. She is tracking ovulation. She gains some weight. She takes prenatal vitamin.     Current medicines (including changes today)  Current Outpatient Medications   Medication Sig Dispense Refill    ALPRAZolam (XANAX) 0.25 MG Tab Take 1 Tablet by mouth at bedtime as needed for Anxiety for up to 30 days. 30 Tablet 0    traZODone (DESYREL) 50 MG Tab Take 1 Tab by mouth at bedtime as needed for Sleep. 90 Tab 3     No current facility-administered medications for this visit.     She  has a past medical history of Anxiety, Depression, and Lactose intolerance.    She has no past medical history of Addisons disease ([facility]), Adrenal disorder ([facility]), Allergy, Anemia, Arrhythmia, Arthritis, Asthma, Blood transfusion without reported diagnosis, Cancer ([facility]), Cataract, CHF (congestive heart failure) ([facility]), Clotting disorder ([facility]), COPD (chronic obstructive pulmonary disease) ([facility]), Cushings syndrome ([facility]), Diabetes ([facility]), Diabetic neuropathy ([facility]), GERD (gastroesophageal reflux disease), Glaucoma, Goiter, Head ache, Heart attack ([facility]), Heart murmur, HIV (human immunodeficiency virus infection) ([facility]), Hyperlipidemia, Hypertension, IBD (inflammatory bowel disease), Kidney disease, Meningitis, Migraine, Muscle disorder, Osteoporosis, Parathyroid disorder ([facility]), Pituitary disease ([facility]), Pulmonary emphysema  "([facility]), Seizure ([facility]), Sickle cell disease ([facility]), Stroke ([facility]), Substance abuse ([facility]), Thyroid disease, Tuberculosis, or Urinary tract infection.    I reviewed patient's problem list, allergies, medications, family hx, social hx with patient and update EPIC.        Objective:     /68 (BP Location: Left arm, Patient Position: Sitting, BP Cuff Size: Adult)   Pulse 64   Temp 36.1 °C (97 °F) (Temporal)   Ht 1.702 m (5' 7\")   Wt 59.9 kg (132 lb)   SpO2 98%  Body mass index is 20.67 kg/m².    Physical Exam:  Constitutional: awake, alert, in no distress.  Skin: Warm, dry, good turgor, no rashes, bruises, ulcers in visible areas.  Eye: conjunctiva clear, lids neg for edema or lesions.  Neck: Trachea midline, no masses, no thyromegaly. No cervical or supraclavicular lymphadenopathy  Respiratory: Unlabored respiratory effort, lungs clear to auscultation, no wheezes, no rales.  Cardiovascular: Normal S1, S2, no murmur, no pedal edema.  Abdomen: Soft, non-tender to palpation, active BS, no hernia, no hepatosplenomegaly, negative rebound or guarding.   Psych: Oriented x3, affect and mood wnl, intact judgement and insight.       Assessment and Plan:   The following treatment plan was discussed    1. Psychophysiological insomnia  Takes Trazodone 12.5 mg PRN for sleep, but rarely requires.   - continue Trazodone PRN   - sleep hygiene     2. Depression with anxiety  Chronic, previously takes Citalopram 20 mg qd, but discontinued couple weeks ago due to improvement of symptoms. She used to work with [employer], but not currently. She is doing well since discontinuation of Citalopram. She takes Xanax PRN for panic attacks in the past and requests refills. Negative history of drugs, alcohol abuse.   - ALPRAZolam (XANAX) 0.25 MG Tab; Take 1 Tablet by mouth at bedtime as needed for Anxiety for up to 30 days.  Dispense: 30 Tablet; Refill: 0  - Risks, benefits, side effects, as well as potential health complications associated with Xanax " were previously discussed with patient. Appropriate counseling provided.    - regular exercises  - follow up PRN     3. PCOS (polycystic ovarian syndrome)  4. Female fertility problem  , sexually active, has known fertility problem attributable to PCOS.   Used to work with fertility specialist at [facility] reproductive medicine  but not currently. Negative history of HSG or saline sonogram according to patient. She also works with [facility].   She is trying to conceive again since 2022.    had sperm study in the past, which showed low sperm count. She might need ovulation induction and IUI.    Menstruation is regular every 28 days with normal flow and minimal cramping.   BMI used to be 17.7, now 20.67.   - prenatal vitamin   - ovulation tracking   - preconception counseling provided. Patient would like to try to conceive on her own first. She will follow up with fertility specialist if not able to conceive by 2023.     5. Lactose intolerance  Controlled with dietary modification, will monitor.   Over-the-counter Lactaid supplement PRN.     6. Need for vaccination  - INFLUENZA VACCINE QUAD INJ (PF)    7. PE (physical exam), annual  General health and wellness counseling provided.      - CBC WITH DIFFERENTIAL; Future  - Comp Metabolic Panel; Future  - HEMOGLOBIN A1C; Future  - Lipid Profile; Future      Ly AMINA Teixeira M.D.      Followup: Return in about 1 year (around 10/21/2023) for annual PE.    Please note that this dictation was created using voice recognition software. I have made every reasonable attempt to correct obvious errors, but I expect that there are errors of grammar and possibly content that I did not discover before finalizing the note.

## 2022-11-03 ENCOUNTER — PATIENT MESSAGE (OUTPATIENT)
Dept: MEDICAL GROUP | Age: 32
End: 2022-11-03

## 2022-11-27 ENCOUNTER — OFFICE VISIT (OUTPATIENT)
Dept: URGENT CARE | Facility: PHYSICIAN GROUP | Age: 32
End: 2022-11-27
Payer: COMMERCIAL

## 2022-11-27 VITALS
HEART RATE: 64 BPM | WEIGHT: 136 LBS | SYSTOLIC BLOOD PRESSURE: 100 MMHG | TEMPERATURE: 98.2 F | RESPIRATION RATE: 16 BRPM | DIASTOLIC BLOOD PRESSURE: 60 MMHG | HEIGHT: 67 IN | BODY MASS INDEX: 21.35 KG/M2 | OXYGEN SATURATION: 99 %

## 2022-11-27 DIAGNOSIS — K04.7 DENTAL INFECTION: ICD-10-CM

## 2022-11-27 PROCEDURE — 99213 OFFICE O/P EST LOW 20 MIN: CPT | Performed by: PHYSICIAN ASSISTANT

## 2022-11-27 RX ORDER — CEFDINIR 300 MG/1
300 CAPSULE ORAL 2 TIMES DAILY
Qty: 14 CAPSULE | Refills: 0 | Status: SHIPPED | OUTPATIENT
Start: 2022-11-27 | End: 2022-12-04

## 2022-11-27 ASSESSMENT — ENCOUNTER SYMPTOMS
EYE DISCHARGE: 0
DIZZINESS: 0
COUGH: 0
ROS SKIN COMMENTS: RIGHT-SIDED FACIAL SWELLING
FEVER: 0
CHILLS: 0
HEADACHES: 0
WHEEZING: 0
SINUS PAIN: 0
EYE PAIN: 0
SHORTNESS OF BREATH: 0
SORE THROAT: 0
DIAPHORESIS: 0
EYE REDNESS: 0

## 2022-11-27 ASSESSMENT — FIBROSIS 4 INDEX: FIB4 SCORE: 0.61

## 2022-11-27 NOTE — PROGRESS NOTES
"  Subjective:     Stacia Lindsey  is a 32 y.o. female who presents for Facial Swelling (X a couple of weeks on (R) side of face after dental procedure)       She presents today with right-sided upper dental pain that has been ongoing over the last 2 weeks.  Symptoms initially began shortly after she had a dental procedure over the right upper jaw.  No fever/chills/sweats, no jaw claudication, no temporal pain, no change in vision.  No jaw malocclusion.  No trauma or injury associated to the jaw except for the dental procedure.  Has been taking twice daily ibuprofen for her symptoms     Review of Systems   Constitutional:  Negative for chills, diaphoresis, fever and malaise/fatigue.   HENT:  Negative for congestion, ear discharge, sinus pain and sore throat.         Right-sided upper jaw pain   Eyes:  Negative for pain, discharge and redness.   Respiratory:  Negative for cough, shortness of breath and wheezing.    Cardiovascular:  Negative for chest pain.   Skin:         Right-sided facial swelling   Neurological:  Negative for dizziness and headaches.    Allergies   Allergen Reactions    Penicillins Rash     Rash on the skin     Past Medical History:   Diagnosis Date    Anxiety     Depression     Lactose intolerance         Objective:   /60 (BP Location: Left arm, Patient Position: Sitting, BP Cuff Size: Adult)   Pulse 64   Temp 36.8 °C (98.2 °F) (Temporal)   Resp 16   Ht 1.702 m (5' 7\")   Wt 61.7 kg (136 lb)   SpO2 99%   BMI 21.30 kg/m²   Physical Exam  Vitals and nursing note reviewed.   Constitutional:       General: She is not in acute distress.     Appearance: Normal appearance. She is not ill-appearing, toxic-appearing or diaphoretic.   HENT:      Head: Normocephalic.      Right Ear: Tympanic membrane, ear canal and external ear normal. There is no impacted cerumen.      Left Ear: Tympanic membrane, ear canal and external ear normal. There is no impacted cerumen.      Nose: No congestion or " rhinorrhea.      Mouth/Throat:      Mouth: Mucous membranes are moist.      Pharynx: No oropharyngeal exudate or posterior oropharyngeal erythema.      Comments: No overt swelling of the upper right-sided gingiva.  No abscess visualized.  No excessive dental caries or dental work.  Eyes:      General:         Right eye: No discharge.         Left eye: No discharge.      Conjunctiva/sclera: Conjunctivae normal.   Cardiovascular:      Rate and Rhythm: Normal rate and regular rhythm.   Pulmonary:      Effort: Pulmonary effort is normal. No respiratory distress.      Breath sounds: Normal breath sounds. No stridor. No wheezing or rhonchi.   Musculoskeletal:      Cervical back: Neck supple.   Lymphadenopathy:      Cervical: No cervical adenopathy.   Neurological:      General: No focal deficit present.      Mental Status: She is alert and oriented to person, place, and time.   Psychiatric:         Mood and Affect: Mood normal.         Behavior: Behavior normal.         Thought Content: Thought content normal.         Judgment: Judgment normal.           Diagnostic testing: None    Assessment/Plan:     Encounter Diagnoses   Name Primary?    Dental infection           Plan for care for today's complaint includes cefdinir for dental infection.  Continue to take over-the-counter ibuprofen for additional symptom support.  Did instruct the patient to contact her dental provider for further evaluation and management.  Return to urgent care or follow-up in the emergency department if symptoms worsen or become severe.  Prescription for cefdinir provided.    See AVS Instructions below for written guidance provided to patient on after-visit management and care in addition to our verbal discussion during the visit.    Please note that this dictation was created using voice recognition software. I have attempted to correct all errors, but there may be sound-alike, spelling, grammar and possibly content errors that I did not discover  before finalizing the note.    Jagdish Pollock PA-C

## 2023-01-12 ENCOUNTER — OFFICE VISIT (OUTPATIENT)
Dept: URGENT CARE | Facility: PHYSICIAN GROUP | Age: 33
End: 2023-01-12
Payer: COMMERCIAL

## 2023-01-12 VITALS
WEIGHT: 136 LBS | HEIGHT: 67 IN | RESPIRATION RATE: 16 BRPM | HEART RATE: 70 BPM | BODY MASS INDEX: 21.35 KG/M2 | TEMPERATURE: 97.7 F | OXYGEN SATURATION: 98 % | DIASTOLIC BLOOD PRESSURE: 58 MMHG | SYSTOLIC BLOOD PRESSURE: 102 MMHG

## 2023-01-12 DIAGNOSIS — J02.0 STREP PHARYNGITIS: ICD-10-CM

## 2023-01-12 LAB
INT CON NEG: NORMAL
INT CON POS: NORMAL
S PYO AG THROAT QL: POSITIVE

## 2023-01-12 PROCEDURE — 87880 STREP A ASSAY W/OPTIC: CPT | Performed by: FAMILY MEDICINE

## 2023-01-12 PROCEDURE — 99213 OFFICE O/P EST LOW 20 MIN: CPT | Performed by: FAMILY MEDICINE

## 2023-01-12 RX ORDER — CEPHALEXIN 500 MG/1
500 CAPSULE ORAL 2 TIMES DAILY
Qty: 20 CAPSULE | Refills: 0 | Status: SHIPPED | OUTPATIENT
Start: 2023-01-12 | End: 2023-01-22

## 2023-01-12 ASSESSMENT — ENCOUNTER SYMPTOMS
NAUSEA: 0
VOMITING: 0
EYE REDNESS: 0
EYE DISCHARGE: 0
WEIGHT LOSS: 0
DIARRHEA: 0

## 2023-01-12 ASSESSMENT — FIBROSIS 4 INDEX: FIB4 SCORE: 0.61

## 2023-01-12 NOTE — PROGRESS NOTES
"Subjective     Elzbieta Lindsey is a 32 y.o. female who presents with Headache, Fever, Sore Throat, and Body Aches            Onset last night subjective fever chills, headache, myalgia, and sore throat. Possible intermittent mild symptoms for 1 month. Strep exposure. Tolerating fluids with normal urine output.  No cough.  No rash.  No other aggravating or alleviating factors.      Review of Systems   Constitutional:  Positive for malaise/fatigue. Negative for weight loss.   Eyes:  Negative for discharge and redness.   Gastrointestinal:  Negative for diarrhea, nausea and vomiting.   Musculoskeletal:  Negative for joint pain.   Skin:  Negative for itching and rash.            Objective     /58   Pulse 70   Temp 36.5 °C (97.7 °F)   Resp 16   Ht 1.702 m (5' 7\")   Wt 61.7 kg (136 lb)   SpO2 98%   BMI 21.30 kg/m²      Physical Exam  Constitutional:       General: She is not in acute distress.     Appearance: She is well-developed.   HENT:      Head: Normocephalic and atraumatic.      Right Ear: Tympanic membrane normal.      Nose: Nose normal. No congestion.      Mouth/Throat:      Mouth: Mucous membranes are moist.      Pharynx: Posterior oropharyngeal erythema present.   Eyes:      Conjunctiva/sclera: Conjunctivae normal.   Cardiovascular:      Rate and Rhythm: Normal rate and regular rhythm.      Heart sounds: Normal heart sounds. No murmur heard.  Pulmonary:      Effort: Pulmonary effort is normal.      Breath sounds: Normal breath sounds. No wheezing.   Musculoskeletal:      Cervical back: Neck supple. No tenderness.   Lymphadenopathy:      Cervical: Cervical adenopathy present.   Skin:     General: Skin is warm and dry.      Findings: No rash.   Neurological:      Mental Status: She is alert.                           Assessment & Plan      Poct strep +    1. Strep pharyngitis  cephALEXin (KEFLEX) 500 MG Cap    POCT Rapid Strep A        Differential diagnosis, natural history, supportive care, and " indications for immediate follow-up were discussed.

## 2023-01-30 ENCOUNTER — OFFICE VISIT (OUTPATIENT)
Dept: MEDICAL GROUP | Age: 33
End: 2023-01-30
Payer: COMMERCIAL

## 2023-01-30 VITALS
SYSTOLIC BLOOD PRESSURE: 108 MMHG | OXYGEN SATURATION: 97 % | DIASTOLIC BLOOD PRESSURE: 60 MMHG | BODY MASS INDEX: 19.62 KG/M2 | HEART RATE: 76 BPM | WEIGHT: 125 LBS | HEIGHT: 67 IN | TEMPERATURE: 98 F

## 2023-01-30 DIAGNOSIS — F41.8 DEPRESSION WITH ANXIETY: ICD-10-CM

## 2023-01-30 DIAGNOSIS — Z11.59 NEED FOR HEPATITIS C SCREENING TEST: ICD-10-CM

## 2023-01-30 DIAGNOSIS — E28.2 PCOS (POLYCYSTIC OVARIAN SYNDROME): ICD-10-CM

## 2023-01-30 DIAGNOSIS — N97.9 FEMALE FERTILITY PROBLEM: ICD-10-CM

## 2023-01-30 DIAGNOSIS — F51.04 PSYCHOPHYSIOLOGICAL INSOMNIA: ICD-10-CM

## 2023-01-30 PROCEDURE — 99214 OFFICE O/P EST MOD 30 MIN: CPT | Performed by: FAMILY MEDICINE

## 2023-01-30 RX ORDER — CITALOPRAM HYDROBROMIDE 10 MG/1
10 TABLET ORAL DAILY
Qty: 90 TABLET | Refills: 3 | Status: SHIPPED | OUTPATIENT
Start: 2023-01-30 | End: 2023-03-14 | Stop reason: SDUPTHER

## 2023-01-30 RX ORDER — ALPRAZOLAM 0.25 MG/1
0.25 TABLET ORAL NIGHTLY PRN
COMMUNITY
End: 2023-05-18 | Stop reason: SDUPTHER

## 2023-01-30 ASSESSMENT — ANXIETY QUESTIONNAIRES
IF YOU CHECKED OFF ANY PROBLEMS ON THIS QUESTIONNAIRE, HOW DIFFICULT HAVE THESE PROBLEMS MADE IT FOR YOU TO DO YOUR WORK, TAKE CARE OF THINGS AT HOME, OR GET ALONG WITH OTHER PEOPLE: VERY DIFFICULT
1. FEELING NERVOUS, ANXIOUS, OR ON EDGE: MORE THAN HALF THE DAYS
7. FEELING AFRAID AS IF SOMETHING AWFUL MIGHT HAPPEN: NOT AT ALL
2. NOT BEING ABLE TO STOP OR CONTROL WORRYING: NEARLY EVERY DAY
6. BECOMING EASILY ANNOYED OR IRRITABLE: SEVERAL DAYS
4. TROUBLE RELAXING: MORE THAN HALF THE DAYS
3. WORRYING TOO MUCH ABOUT DIFFERENT THINGS: SEVERAL DAYS
GAD7 TOTAL SCORE: 9
5. BEING SO RESTLESS THAT IT IS HARD TO SIT STILL: NOT AT ALL

## 2023-01-30 ASSESSMENT — FIBROSIS 4 INDEX: FIB4 SCORE: 0.61

## 2023-01-30 ASSESSMENT — PATIENT HEALTH QUESTIONNAIRE - PHQ9
5. POOR APPETITE OR OVEREATING: 1 - SEVERAL DAYS
SUM OF ALL RESPONSES TO PHQ QUESTIONS 1-9: 4
CLINICAL INTERPRETATION OF PHQ2 SCORE: 2

## 2023-01-31 NOTE — PROGRESS NOTES
Subjective:   CC: Mental health follow-up    HPI:     Stacia Lindsey is a 32 y.o. female, established patient of the clinic.     Patient has chronic depression with anxiety, previously controlled with citalopram 10 mg daily.  Patient decided to stop medication a few months ago.  She complains of worsening depression, anxiety after discontinuation of medication.  She continues to work with behavioral health once every 3 weeks.  She complains of relationship problem, quality of life and mood problems with the medication.  She wishes to restart citalopram.  She continues to take Xanax 0.25 mg as needed for anxiety.  She is also taking trazodone 50 mg nightly as needed for sleep.    Patient has known female fertility problem secondary to PCOS.  Patient currently does not work with fertility specialist.  She is still trying to conceive on her own.     Current medicines (including changes today)  Current Outpatient Medications   Medication Sig Dispense Refill    ALPRAZolam (XANAX) 0.25 MG Tab Take 0.25 mg by mouth at bedtime as needed for Sleep.      citalopram (CELEXA) 10 MG tablet Take 1 Tablet by mouth every day. 90 Tablet 3    traZODone (DESYREL) 50 MG Tab Take 1 Tab by mouth at bedtime as needed for Sleep. 90 Tab 3     No current facility-administered medications for this visit.     She  has a past medical history of Anxiety, Depression, and Lactose intolerance.    She has no past medical history of Addisons disease ([facility]), Adrenal disorder ([facility]), Allergy, Anemia, Arrhythmia, Arthritis, Asthma, Blood transfusion without reported diagnosis, Cancer ([facility]), Cataract, CHF (congestive heart failure) ([facility]), Clotting disorder ([facility]), COPD (chronic obstructive pulmonary disease) ([facility]), Cushings syndrome ([facility]), Diabetes ([facility]), Diabetic neuropathy ([facility]), GERD (gastroesophageal reflux disease), Glaucoma, Goiter, Head ache, Heart attack ([facility]), Heart murmur, HIV (human immunodeficiency virus infection) ([facility]), Hyperlipidemia,  "Hypertension, IBD (inflammatory bowel disease), Kidney disease, Meningitis, Migraine, Muscle disorder, Osteoporosis, Parathyroid disorder (HCC), Pituitary disease (HCC), Pulmonary emphysema (HCC), Seizure (HCC), Sickle cell disease (HCC), Stroke (HCC), Substance abuse (HCC), Thyroid disease, Tuberculosis, or Urinary tract infection.    I reviewed patient's problem list, allergies, medications, family hx, social hx with patient and update EPIC.        Objective:     /60 (BP Location: Right arm, Patient Position: Sitting, BP Cuff Size: Small adult)   Pulse 76   Temp 36.7 °C (98 °F) (Temporal)   Ht 1.702 m (5' 7\")   Wt 56.7 kg (125 lb)   SpO2 97%  Body mass index is 19.58 kg/m².    Physical Exam:  Constitutional: awake, alert, in no distress.  Skin: Warm, dry, good turgor, no rashes, bruises, ulcers in visible areas.  Eye: conjunctiva clear, lids neg for edema or lesions.  Respiratory: Unlabored respiratory effort, lungs clear to auscultation, no wheezes, no rales.  Cardiovascular: Normal S1, S2, no murmur, no pedal edema.  Psych: Oriented x3, affect and mood wnl, intact judgement and insight.       Assessment and Plan:   The following treatment plan was discussed    1. Depression with anxiety  Chronic, previously controlled with citalopram 20 mg daily.  Patient complains of relapse of symptoms after discontinuation of citalopram a few months ago.  She continues to work with behavioral counseling once every 3 weeks.  She wishes to restart citalopram.  - citalopram (CELEXA) 10 MG tablet; Take 1 Tablet by mouth every day.  Dispense: 90 Tablet; Refill: 3  - Okay to increase citalopram to 20 mg daily after 6 weeks as needed.  - Continue citalopram 0.25 mg daily as needed for anxiety flare  - Continue to work with behavioral health  - follow up in 6 months PRN     2. Psychophysiological insomnia  Chronic, controlled with trazodone 50 mg nightly as needed, no s/e reported, will continue.      3. Female fertility " problem  4. PCOS (polycystic ovarian syndrome)  Chronic PCOS with known infertility.  Patient is not working with fertility specialist at this time.  She is still trying to conceive naturally.  She will follow-up with me as needed    5. Need for hepatitis C screening test  - HEP C VIRUS ANTIBODY; Future       Ly AMINA Teixeira M.D.      Followup: Return in about 6 months (around 7/30/2023) for annual PE.    Please note that this dictation was created using voice recognition software. I have made every reasonable attempt to correct obvious errors, but I expect that there are errors of grammar and possibly content that I did not discover before finalizing the note.

## 2023-02-01 ENCOUNTER — PATIENT MESSAGE (OUTPATIENT)
Dept: MEDICAL GROUP | Age: 33
End: 2023-02-01
Payer: COMMERCIAL

## 2023-02-01 DIAGNOSIS — R23.2 HOT FLASHES: ICD-10-CM

## 2023-02-03 ENCOUNTER — HOSPITAL ENCOUNTER (OUTPATIENT)
Dept: LAB | Facility: MEDICAL CENTER | Age: 33
End: 2023-02-03
Attending: FAMILY MEDICINE
Payer: COMMERCIAL

## 2023-02-03 DIAGNOSIS — R23.2 HOT FLASHES: ICD-10-CM

## 2023-02-03 DIAGNOSIS — Z11.59 NEED FOR HEPATITIS C SCREENING TEST: ICD-10-CM

## 2023-02-03 DIAGNOSIS — Z00.00 PE (PHYSICAL EXAM), ANNUAL: ICD-10-CM

## 2023-02-03 LAB
ALBUMIN SERPL BCP-MCNC: 4.7 G/DL (ref 3.2–4.9)
ALBUMIN/GLOB SERPL: 1.7 G/DL
ALP SERPL-CCNC: 44 U/L (ref 30–99)
ALT SERPL-CCNC: 11 U/L (ref 2–50)
ANION GAP SERPL CALC-SCNC: 12 MMOL/L (ref 7–16)
AST SERPL-CCNC: 13 U/L (ref 12–45)
BASOPHILS # BLD AUTO: 1.4 % (ref 0–1.8)
BASOPHILS # BLD: 0.07 K/UL (ref 0–0.12)
BILIRUB SERPL-MCNC: 0.5 MG/DL (ref 0.1–1.5)
BUN SERPL-MCNC: 15 MG/DL (ref 8–22)
CALCIUM ALBUM COR SERPL-MCNC: 9 MG/DL (ref 8.5–10.5)
CALCIUM SERPL-MCNC: 9.6 MG/DL (ref 8.5–10.5)
CHLORIDE SERPL-SCNC: 105 MMOL/L (ref 96–112)
CHOLEST SERPL-MCNC: 183 MG/DL (ref 100–199)
CO2 SERPL-SCNC: 24 MMOL/L (ref 20–33)
CREAT SERPL-MCNC: 0.77 MG/DL (ref 0.5–1.4)
EOSINOPHIL # BLD AUTO: 0.13 K/UL (ref 0–0.51)
EOSINOPHIL NFR BLD: 2.6 % (ref 0–6.9)
ERYTHROCYTE [DISTWIDTH] IN BLOOD BY AUTOMATED COUNT: 44.1 FL (ref 35.9–50)
EST. AVERAGE GLUCOSE BLD GHB EST-MCNC: 97 MG/DL
FSH SERPL-ACNC: 8.2 MIU/ML
GFR SERPLBLD CREATININE-BSD FMLA CKD-EPI: 106 ML/MIN/1.73 M 2
GLOBULIN SER CALC-MCNC: 2.7 G/DL (ref 1.9–3.5)
GLUCOSE SERPL-MCNC: 84 MG/DL (ref 65–99)
HBA1C MFR BLD: 5 % (ref 4–5.6)
HCT VFR BLD AUTO: 41.6 % (ref 37–47)
HCV AB SER QL: NORMAL
HDLC SERPL-MCNC: 71 MG/DL
HGB BLD-MCNC: 13.7 G/DL (ref 12–16)
IMM GRANULOCYTES # BLD AUTO: 0.01 K/UL (ref 0–0.11)
IMM GRANULOCYTES NFR BLD AUTO: 0.2 % (ref 0–0.9)
LDLC SERPL CALC-MCNC: 105 MG/DL
LH SERPL-ACNC: 15.2 IU/L
LYMPHOCYTES # BLD AUTO: 2.07 K/UL (ref 1–4.8)
LYMPHOCYTES NFR BLD: 40.7 % (ref 22–41)
MCH RBC QN AUTO: 30.9 PG (ref 27–33)
MCHC RBC AUTO-ENTMCNC: 32.9 G/DL (ref 33.6–35)
MCV RBC AUTO: 93.9 FL (ref 81.4–97.8)
MONOCYTES # BLD AUTO: 0.56 K/UL (ref 0–0.85)
MONOCYTES NFR BLD AUTO: 11 % (ref 0–13.4)
NEUTROPHILS # BLD AUTO: 2.24 K/UL (ref 2–7.15)
NEUTROPHILS NFR BLD: 44.1 % (ref 44–72)
NRBC # BLD AUTO: 0 K/UL
NRBC BLD-RTO: 0 /100 WBC
PLATELET # BLD AUTO: 251 K/UL (ref 164–446)
PMV BLD AUTO: 9.7 FL (ref 9–12.9)
POTASSIUM SERPL-SCNC: 4 MMOL/L (ref 3.6–5.5)
PROT SERPL-MCNC: 7.4 G/DL (ref 6–8.2)
RBC # BLD AUTO: 4.43 M/UL (ref 4.2–5.4)
SODIUM SERPL-SCNC: 141 MMOL/L (ref 135–145)
TRIGL SERPL-MCNC: 33 MG/DL (ref 0–149)
TSH SERPL DL<=0.005 MIU/L-ACNC: 2.19 UIU/ML (ref 0.38–5.33)
WBC # BLD AUTO: 5.1 K/UL (ref 4.8–10.8)

## 2023-02-03 PROCEDURE — 83036 HEMOGLOBIN GLYCOSYLATED A1C: CPT

## 2023-02-03 PROCEDURE — 83002 ASSAY OF GONADOTROPIN (LH): CPT

## 2023-02-03 PROCEDURE — 85025 COMPLETE CBC W/AUTO DIFF WBC: CPT

## 2023-02-03 PROCEDURE — 80061 LIPID PANEL: CPT

## 2023-02-03 PROCEDURE — 80053 COMPREHEN METABOLIC PANEL: CPT

## 2023-02-03 PROCEDURE — 36415 COLL VENOUS BLD VENIPUNCTURE: CPT

## 2023-02-03 PROCEDURE — 84443 ASSAY THYROID STIM HORMONE: CPT

## 2023-02-03 PROCEDURE — 83001 ASSAY OF GONADOTROPIN (FSH): CPT

## 2023-02-03 PROCEDURE — 86803 HEPATITIS C AB TEST: CPT

## 2023-02-04 LAB — FASTING STATUS PATIENT QL REPORTED: NORMAL

## 2023-02-06 PROBLEM — E78.00 PURE HYPERCHOLESTEROLEMIA: Status: ACTIVE | Noted: 2023-02-06

## 2023-03-14 DIAGNOSIS — F41.8 DEPRESSION WITH ANXIETY: ICD-10-CM

## 2023-03-14 RX ORDER — CITALOPRAM HYDROBROMIDE 10 MG/1
10 TABLET ORAL DAILY
Qty: 90 TABLET | Refills: 3 | Status: SHIPPED | OUTPATIENT
Start: 2023-03-14 | End: 2023-08-14 | Stop reason: SDUPTHER

## 2023-04-16 ENCOUNTER — OFFICE VISIT (OUTPATIENT)
Dept: URGENT CARE | Facility: PHYSICIAN GROUP | Age: 33
End: 2023-04-16
Payer: COMMERCIAL

## 2023-04-16 VITALS
OXYGEN SATURATION: 95 % | RESPIRATION RATE: 16 BRPM | SYSTOLIC BLOOD PRESSURE: 98 MMHG | DIASTOLIC BLOOD PRESSURE: 60 MMHG | TEMPERATURE: 97.5 F | WEIGHT: 125 LBS | BODY MASS INDEX: 19.62 KG/M2 | HEIGHT: 67 IN | HEART RATE: 71 BPM

## 2023-04-16 DIAGNOSIS — J30.9 ALLERGIC RHINITIS, UNSPECIFIED SEASONALITY, UNSPECIFIED TRIGGER: ICD-10-CM

## 2023-04-16 DIAGNOSIS — J02.9 SORE THROAT: ICD-10-CM

## 2023-04-16 LAB — S PYO DNA SPEC NAA+PROBE: NOT DETECTED

## 2023-04-16 PROCEDURE — 99213 OFFICE O/P EST LOW 20 MIN: CPT | Performed by: NURSE PRACTITIONER

## 2023-04-16 PROCEDURE — 87651 STREP A DNA AMP PROBE: CPT | Performed by: NURSE PRACTITIONER

## 2023-04-16 ASSESSMENT — ENCOUNTER SYMPTOMS
DIARRHEA: 0
COUGH: 0
ORTHOPNEA: 0
NAUSEA: 0
EYE DISCHARGE: 0
HEADACHES: 0
FEVER: 0
SORE THROAT: 1
CHILLS: 0
MYALGIAS: 1

## 2023-04-16 ASSESSMENT — FIBROSIS 4 INDEX: FIB4 SCORE: 0.47

## 2023-04-16 NOTE — PROGRESS NOTES
Subjective     Elzbieta Lindsey is a 32 y.o. female who presents with Sore Throat (X2 weeks. )            HPI  New problem.  Patient is a very pleasant 32-year-old female who presents with a sore throat for 2 weeks.  She also reports sneezing and runny nose with the symptoms.  She also reports having a mild low-grade fever however today she is afebrile.  She denies cough, ear pain, nausea, or diarrhea.  She denies headache.  She does report mild body aches as well.  She has been using over-the-counter salt water gargles for her symptoms.    Penicillins  Current Outpatient Medications on File Prior to Visit   Medication Sig Dispense Refill    citalopram (CELEXA) 10 MG tablet Take 1 Tablet by mouth every day. 90 Tablet 3    ALPRAZolam (XANAX) 0.25 MG Tab Take 0.25 mg by mouth at bedtime as needed for Sleep.      traZODone (DESYREL) 50 MG Tab Take 1 Tab by mouth at bedtime as needed for Sleep. 90 Tab 3     No current facility-administered medications on file prior to visit.     Social History     Socioeconomic History    Marital status:      Spouse name: Not on file    Number of children: 0    Years of education: Not on file    Highest education level: Not on file   Occupational History    Not on file   Tobacco Use    Smoking status: Never    Smokeless tobacco: Never   Vaping Use    Vaping Use: Never used   Substance and Sexual Activity    Alcohol use: Not Currently    Drug use: No    Sexual activity: Yes     Partners: Male   Other Topics Concern    Not on file   Social History Narrative    Not on file     Social Determinants of Health     Financial Resource Strain: Not on file   Food Insecurity: Not on file   Transportation Needs: Not on file   Physical Activity: Not on file   Stress: Not on file   Social Connections: Not on file   Intimate Partner Violence: Not on file   Housing Stability: Not on file     Breast Cancer-related family history is not on file.      Review of Systems   Constitutional:  Positive  "for malaise/fatigue. Negative for chills and fever.   HENT:  Positive for congestion and sore throat.    Eyes:  Negative for discharge.   Respiratory:  Negative for cough.    Cardiovascular:  Negative for chest pain and orthopnea.   Gastrointestinal:  Negative for diarrhea and nausea.   Musculoskeletal:  Positive for myalgias.   Neurological:  Negative for headaches.   Endo/Heme/Allergies:  Negative for environmental allergies.            Objective     BP 98/60 (BP Location: Right arm, Patient Position: Sitting, BP Cuff Size: Adult)   Pulse 71   Temp 36.4 °C (97.5 °F) (Temporal)   Resp 16   Ht 1.702 m (5' 7\")   Wt 56.7 kg (125 lb)   SpO2 95%   BMI 19.58 kg/m²      Physical Exam  Vitals and nursing note reviewed.   Constitutional:       General: She is not in acute distress.     Appearance: She is well-developed.   HENT:      Head: Normocephalic.      Right Ear: External ear normal.      Left Ear: External ear normal.      Nose: Mucosal edema and rhinorrhea present.      Mouth/Throat:      Pharynx: No posterior oropharyngeal erythema.      Comments: Posterior pharynx with cobblestone appearance which is consistent with postnasal drainage.  Eyes:      General:         Right eye: No discharge.         Left eye: No discharge.      Conjunctiva/sclera: Conjunctivae normal.   Cardiovascular:      Rate and Rhythm: Normal rate and regular rhythm.      Heart sounds: Normal heart sounds.   Pulmonary:      Effort: Pulmonary effort is normal.      Breath sounds: Normal breath sounds.   Musculoskeletal:         General: Normal range of motion.      Cervical back: Normal range of motion and neck supple.   Lymphadenopathy:      Cervical: No cervical adenopathy.   Skin:     General: Skin is warm and dry.   Neurological:      Mental Status: She is alert and oriented to person, place, and time.   Psychiatric:         Behavior: Behavior normal.         Thought Content: Thought content normal.                           Assessment " & Plan        1. Allergic rhinitis, unspecified seasonality, unspecified trigger        2. Sore throat  POCT CEPHEID GROUP A STREP - PCR        Strep negative.  Recommend for the patient to trial an over-the-counter allergy type medication for her symptoms.  There is no indication at this time for antibiotic therapy.  She will follow-up if symptoms or not improving on the allergy medication.

## 2023-05-18 ENCOUNTER — PATIENT MESSAGE (OUTPATIENT)
Dept: MEDICAL GROUP | Age: 33
End: 2023-05-18
Payer: COMMERCIAL

## 2023-05-18 DIAGNOSIS — F41.8 DEPRESSION WITH ANXIETY: ICD-10-CM

## 2023-05-19 RX ORDER — ALPRAZOLAM 0.25 MG/1
0.25 TABLET ORAL NIGHTLY PRN
Qty: 30 TABLET | Refills: 0 | Status: SHIPPED | OUTPATIENT
Start: 2023-05-19 | End: 2023-06-18

## 2023-05-19 NOTE — PROGRESS NOTES
Please make sure to change pharmacy accordingly to avoid medications being sent to non-desirable pharmacy.   Brisa Teixeira M.D.

## 2023-05-23 DIAGNOSIS — F41.8 DEPRESSION WITH ANXIETY: ICD-10-CM

## 2023-05-23 NOTE — TELEPHONE ENCOUNTER
Received request via: Patient    Was the patient seen in the last year in this department? Yes    Does the patient have an active prescription (recently filled or refills available) for medication(s) requested?  YES    Does the patient have retirement Plus and need 100 day supply (blood pressure, diabetes and cholesterol meds only)? Patient does not have SCP

## 2023-05-25 ENCOUNTER — OFFICE VISIT (OUTPATIENT)
Dept: URGENT CARE | Facility: CLINIC | Age: 33
End: 2023-05-25
Payer: COMMERCIAL

## 2023-05-25 VITALS
SYSTOLIC BLOOD PRESSURE: 100 MMHG | OXYGEN SATURATION: 95 % | DIASTOLIC BLOOD PRESSURE: 68 MMHG | WEIGHT: 128.4 LBS | TEMPERATURE: 97.3 F | HEIGHT: 67 IN | RESPIRATION RATE: 16 BRPM | BODY MASS INDEX: 20.15 KG/M2 | HEART RATE: 75 BPM

## 2023-05-25 DIAGNOSIS — N30.01 ACUTE CYSTITIS WITH HEMATURIA: ICD-10-CM

## 2023-05-25 LAB
APPEARANCE UR: CLEAR
BILIRUB UR STRIP-MCNC: NEGATIVE MG/DL
COLOR UR AUTO: NORMAL
GLUCOSE UR STRIP.AUTO-MCNC: 100 MG/DL
KETONES UR STRIP.AUTO-MCNC: NORMAL MG/DL
LEUKOCYTE ESTERASE UR QL STRIP.AUTO: NORMAL
NITRITE UR QL STRIP.AUTO: POSITIVE
PH UR STRIP.AUTO: 5 [PH] (ref 5–8)
POCT INT CON NEG: NEGATIVE
POCT INT CON POS: POSITIVE
POCT URINE PREGNANCY TEST: NEGATIVE
PROT UR QL STRIP: 30 MG/DL
RBC UR QL AUTO: NORMAL
SP GR UR STRIP.AUTO: 1.01
UROBILINOGEN UR STRIP-MCNC: 2 MG/DL

## 2023-05-25 PROCEDURE — 3074F SYST BP LT 130 MM HG: CPT | Performed by: NURSE PRACTITIONER

## 2023-05-25 PROCEDURE — 3078F DIAST BP <80 MM HG: CPT | Performed by: NURSE PRACTITIONER

## 2023-05-25 PROCEDURE — 81025 URINE PREGNANCY TEST: CPT | Performed by: NURSE PRACTITIONER

## 2023-05-25 PROCEDURE — 81002 URINALYSIS NONAUTO W/O SCOPE: CPT | Performed by: NURSE PRACTITIONER

## 2023-05-25 PROCEDURE — 99213 OFFICE O/P EST LOW 20 MIN: CPT | Performed by: NURSE PRACTITIONER

## 2023-05-25 RX ORDER — NITROFURANTOIN 25; 75 MG/1; MG/1
100 CAPSULE ORAL 2 TIMES DAILY
Qty: 10 CAPSULE | Refills: 0 | Status: SHIPPED | OUTPATIENT
Start: 2023-05-25 | End: 2023-05-30

## 2023-05-25 ASSESSMENT — ENCOUNTER SYMPTOMS
VOMITING: 0
CHILLS: 0
FLANK PAIN: 0
NAUSEA: 0
ABDOMINAL PAIN: 0
FEVER: 0

## 2023-05-25 ASSESSMENT — FIBROSIS 4 INDEX: FIB4 SCORE: 0.47

## 2023-05-26 RX ORDER — ALPRAZOLAM 0.25 MG/1
0.25 TABLET ORAL NIGHTLY PRN
Qty: 30 TABLET | Refills: 0 | OUTPATIENT
Start: 2023-05-26 | End: 2023-06-25

## 2023-06-22 ENCOUNTER — OFFICE VISIT (OUTPATIENT)
Dept: MEDICAL GROUP | Age: 33
End: 2023-06-22
Payer: COMMERCIAL

## 2023-06-22 VITALS
BODY MASS INDEX: 19.3 KG/M2 | SYSTOLIC BLOOD PRESSURE: 108 MMHG | TEMPERATURE: 97.9 F | HEIGHT: 67 IN | OXYGEN SATURATION: 97 % | WEIGHT: 123 LBS | HEART RATE: 54 BPM | DIASTOLIC BLOOD PRESSURE: 64 MMHG

## 2023-06-22 DIAGNOSIS — F51.04 PSYCHOPHYSIOLOGICAL INSOMNIA: ICD-10-CM

## 2023-06-22 DIAGNOSIS — E28.2 PCOS (POLYCYSTIC OVARIAN SYNDROME): ICD-10-CM

## 2023-06-22 DIAGNOSIS — F41.8 DEPRESSION WITH ANXIETY: ICD-10-CM

## 2023-06-22 DIAGNOSIS — R63.0 POOR APPETITE: ICD-10-CM

## 2023-06-22 DIAGNOSIS — N97.9 FEMALE FERTILITY PROBLEM: ICD-10-CM

## 2023-06-22 PROCEDURE — 3078F DIAST BP <80 MM HG: CPT | Performed by: FAMILY MEDICINE

## 2023-06-22 PROCEDURE — 99214 OFFICE O/P EST MOD 30 MIN: CPT | Performed by: FAMILY MEDICINE

## 2023-06-22 PROCEDURE — 3074F SYST BP LT 130 MM HG: CPT | Performed by: FAMILY MEDICINE

## 2023-06-22 RX ORDER — MIRTAZAPINE 7.5 MG/1
7.5 TABLET, FILM COATED ORAL NIGHTLY
Qty: 90 TABLET | Refills: 1 | Status: SHIPPED | OUTPATIENT
Start: 2023-06-22 | End: 2023-08-14

## 2023-06-22 RX ORDER — ALPRAZOLAM 0.25 MG/1
0.25 TABLET ORAL NIGHTLY PRN
COMMUNITY
End: 2023-10-16

## 2023-06-22 ASSESSMENT — FIBROSIS 4 INDEX: FIB4 SCORE: 0.47

## 2023-06-22 NOTE — PROGRESS NOTES
Subjective:   CC: Mental health follow-up    HPI:     Stacia Lindsey is a 32 y.o. female, established patient of the clinic.     Patient had chronic depression with anxiety, currently controlled with citalopram 10 mg daily and alprazolam 0.25 mg as needed.  She is also taking trazodone as needed for sleep.  Patient was previously diagnosed with PCOS and female fertility problem.  She was working with Dr. Cobos in the past, but work-ups were disrupted because of the pandemic.  Her  has normal sperm count.  Patient is trying to get pregnant again for more than 1 year without success.  This leads to worsening anxiety, stress, poor appetite, weight loss, insomnia.  She wishes to be referred back to fertility specialist for consultation.      Current medicines (including changes today)  Current Outpatient Medications   Medication Sig Dispense Refill    ALPRAZolam (XANAX) 0.25 MG Tab Take 0.25 mg by mouth at bedtime as needed for Sleep.      mirtazapine (REMERON) 7.5 MG tablet Take 1 Tablet by mouth every evening. 90 Tablet 1    citalopram (CELEXA) 10 MG tablet Take 1 Tablet by mouth every day. 90 Tablet 3     No current facility-administered medications for this visit.     She  has a past medical history of Anxiety, Depression, Lactose intolerance, and Pure hypercholesterolemia (2/6/2023).    She has no past medical history of Addisons disease ([facility]), Adrenal disorder ([facility]), Allergy, Anemia, Arrhythmia, Arthritis, Asthma, Blood transfusion without reported diagnosis, Cancer ([facility]), Cataract, CHF (congestive heart failure) ([facility]), Clotting disorder ([facility]), COPD (chronic obstructive pulmonary disease) ([facility]), Cushings syndrome ([facility]), Diabetes ([facility]), Diabetic neuropathy ([facility]), GERD (gastroesophageal reflux disease), Glaucoma, Goiter, Head ache, Heart attack ([facility]), Heart murmur, HIV (human immunodeficiency virus infection) ([facility]), Hypertension, IBD (inflammatory bowel disease), Kidney disease, Meningitis,  "Migraine, Muscle disorder, Osteoporosis, Parathyroid disorder (HCC), Pituitary disease (HCC), Pulmonary emphysema (HCC), Seizure (HCC), Sickle cell disease (HCC), Stroke (HCC), Substance abuse (HCC), Thyroid disease, Tuberculosis, or Urinary tract infection.    I reviewed patient's problem list, allergies, medications, family hx, social hx with patient and update EPIC.        Objective:     /64 (BP Location: Left arm, Patient Position: Sitting, BP Cuff Size: Adult)   Pulse (!) 54   Temp 36.6 °C (97.9 °F) (Temporal)   Ht 1.702 m (5' 7\")   Wt 55.8 kg (123 lb)   SpO2 97%  Body mass index is 19.26 kg/m².    Physical Exam:  Constitutional: awake, alert, in no distress.  Skin: Warm, dry, good turgor, no rashes, bruises, ulcers in visible areas.  Eye: conjunctiva clear, lids neg for edema or lesions.  Neck: Trachea midline, no masses, no thyromegaly. No cervical or supraclavicular lymphadenopathy  Respiratory: Unlabored respiratory effort, lungs clear to auscultation, no wheezes, no rales.  Cardiovascular: Normal S1, S2, no murmur, no pedal edema.  Psych: Oriented x3, affect and mood wnl, intact judgement and insight.       Assessment and Plan:   The following treatment plan was discussed    1. Depression with anxiety  Chronic, controlled with citalopram 10 mg daily, Xanax 0.25 mg as needed for panic attacks, no s/e reported, will continue.  Patient was previously taking trazodone as needed for sleep.  We will switch patient to mirtazapine 7.5 mg nightly for sleep, mood, and poor appetite.  -Continue citalopram 10 mg daily and Xanax 0.25 mg for panic attacks.  -Mirtazapine 7.5 mg nightly.    2. Female fertility problem  3. PCOS (polycystic ovarian syndrome)  - Referral to Infertility    4. Psychophysiological insomnia  - mirtazapine (REMERON) 7.5 MG tablet; Take 1 Tablet by mouth every evening.  Dispense: 90 Tablet; Refill: 1    5. Poor appetite  - mirtazapine (REMERON) 7.5 MG tablet; Take 1 Tablet by mouth " every evening.  Dispense: 90 Tablet; Refill: 1    6. Body mass index (BMI) of 19.0-19.9 in adult  - mirtazapine (REMERON) 7.5 MG tablet; Take 1 Tablet by mouth every evening.  Dispense: 90 Tablet; Refill: 1       Ly AMINA Teixeira M.D.      Followup: Return in about 3 months (around 9/22/2023) for As needed.    Please note that this dictation was created using voice recognition software. I have made every reasonable attempt to correct obvious errors, but I expect that there are errors of grammar and possibly content that I did not discover before finalizing the note.

## 2023-06-26 ENCOUNTER — TELEPHONE (OUTPATIENT)
Dept: MEDICAL GROUP | Age: 33
End: 2023-06-26
Payer: COMMERCIAL

## 2023-06-26 NOTE — TELEPHONE ENCOUNTER
Pt message please advise :  Mirta Teixeira!  I had one time of Mirtazapine before sleep and next day couldn’t wake up until noon and all day struggling to function normal, was feeling sleepy and drowsiness. I don’t like such effect and feel very uncomfortable.   May be I could do Citalopram 20 mg which was making me feel good and gain normal weight since last time I was taking it?   Please tell me what I can do. I was thinking to try 1/2 of Mirtazapine before sleep but since it make me feel so uncomfortable next morning I prefer to try something else.   Thank you  Regards, Elzbieta Lindsey.

## 2023-08-14 DIAGNOSIS — F41.8 DEPRESSION WITH ANXIETY: ICD-10-CM

## 2023-08-14 DIAGNOSIS — F51.04 PSYCHOPHYSIOLOGICAL INSOMNIA: ICD-10-CM

## 2023-08-14 DIAGNOSIS — R63.0 POOR APPETITE: ICD-10-CM

## 2023-08-14 RX ORDER — CITALOPRAM 20 MG/1
20 TABLET ORAL DAILY
Qty: 90 TABLET | Refills: 1 | Status: SHIPPED | OUTPATIENT
Start: 2023-08-14

## 2023-10-06 ENCOUNTER — PATIENT MESSAGE (OUTPATIENT)
Dept: MEDICAL GROUP | Facility: MEDICAL CENTER | Age: 33
End: 2023-10-06
Payer: COMMERCIAL

## 2023-10-06 DIAGNOSIS — Z00.00 PE (PHYSICAL EXAM), ANNUAL: ICD-10-CM

## 2023-10-16 ENCOUNTER — OFFICE VISIT (OUTPATIENT)
Dept: MEDICAL GROUP | Facility: MEDICAL CENTER | Age: 33
End: 2023-10-16
Payer: COMMERCIAL

## 2023-10-16 ENCOUNTER — HOSPITAL ENCOUNTER (OUTPATIENT)
Dept: LAB | Facility: MEDICAL CENTER | Age: 33
End: 2023-10-16
Attending: FAMILY MEDICINE
Payer: COMMERCIAL

## 2023-10-16 VITALS
HEART RATE: 61 BPM | OXYGEN SATURATION: 94 % | HEIGHT: 68 IN | BODY MASS INDEX: 19.85 KG/M2 | DIASTOLIC BLOOD PRESSURE: 60 MMHG | WEIGHT: 130.95 LBS | TEMPERATURE: 98 F | SYSTOLIC BLOOD PRESSURE: 102 MMHG

## 2023-10-16 DIAGNOSIS — E28.2 PCOS (POLYCYSTIC OVARIAN SYNDROME): ICD-10-CM

## 2023-10-16 DIAGNOSIS — F51.04 PSYCHOPHYSIOLOGICAL INSOMNIA: ICD-10-CM

## 2023-10-16 DIAGNOSIS — Z00.00 PE (PHYSICAL EXAM), ANNUAL: Primary | ICD-10-CM

## 2023-10-16 DIAGNOSIS — N97.9 FEMALE FERTILITY PROBLEM: ICD-10-CM

## 2023-10-16 DIAGNOSIS — E78.00 PURE HYPERCHOLESTEROLEMIA: ICD-10-CM

## 2023-10-16 DIAGNOSIS — Z00.00 PE (PHYSICAL EXAM), ANNUAL: ICD-10-CM

## 2023-10-16 DIAGNOSIS — F41.8 DEPRESSION WITH ANXIETY: ICD-10-CM

## 2023-10-16 LAB
ALBUMIN SERPL BCP-MCNC: 4.2 G/DL (ref 3.2–4.9)
ALBUMIN/GLOB SERPL: 1.6 G/DL
ALP SERPL-CCNC: 44 U/L (ref 30–99)
ALT SERPL-CCNC: 9 U/L (ref 2–50)
ANION GAP SERPL CALC-SCNC: 9 MMOL/L (ref 7–16)
AST SERPL-CCNC: 15 U/L (ref 12–45)
BASOPHILS # BLD AUTO: 1.6 % (ref 0–1.8)
BASOPHILS # BLD: 0.09 K/UL (ref 0–0.12)
BILIRUB SERPL-MCNC: 0.4 MG/DL (ref 0.1–1.5)
BUN SERPL-MCNC: 13 MG/DL (ref 8–22)
CALCIUM ALBUM COR SERPL-MCNC: 8.8 MG/DL (ref 8.5–10.5)
CALCIUM SERPL-MCNC: 9 MG/DL (ref 8.5–10.5)
CHLORIDE SERPL-SCNC: 105 MMOL/L (ref 96–112)
CHOLEST SERPL-MCNC: 165 MG/DL (ref 100–199)
CO2 SERPL-SCNC: 25 MMOL/L (ref 20–33)
CREAT SERPL-MCNC: 0.66 MG/DL (ref 0.5–1.4)
EOSINOPHIL # BLD AUTO: 0.16 K/UL (ref 0–0.51)
EOSINOPHIL NFR BLD: 2.8 % (ref 0–6.9)
ERYTHROCYTE [DISTWIDTH] IN BLOOD BY AUTOMATED COUNT: 43.3 FL (ref 35.9–50)
EST. AVERAGE GLUCOSE BLD GHB EST-MCNC: 97 MG/DL
FASTING STATUS PATIENT QL REPORTED: NORMAL
GFR SERPLBLD CREATININE-BSD FMLA CKD-EPI: 120 ML/MIN/1.73 M 2
GLOBULIN SER CALC-MCNC: 2.7 G/DL (ref 1.9–3.5)
GLUCOSE SERPL-MCNC: 79 MG/DL (ref 65–99)
HBA1C MFR BLD: 5 % (ref 4–5.6)
HCT VFR BLD AUTO: 40.4 % (ref 37–47)
HDLC SERPL-MCNC: 73 MG/DL
HGB BLD-MCNC: 12.9 G/DL (ref 12–16)
IMM GRANULOCYTES # BLD AUTO: 0.01 K/UL (ref 0–0.11)
IMM GRANULOCYTES NFR BLD AUTO: 0.2 % (ref 0–0.9)
LDLC SERPL CALC-MCNC: 85 MG/DL
LYMPHOCYTES # BLD AUTO: 2.16 K/UL (ref 1–4.8)
LYMPHOCYTES NFR BLD: 38.2 % (ref 22–41)
MCH RBC QN AUTO: 29.7 PG (ref 27–33)
MCHC RBC AUTO-ENTMCNC: 31.9 G/DL (ref 32.2–35.5)
MCV RBC AUTO: 93.1 FL (ref 81.4–97.8)
MONOCYTES # BLD AUTO: 0.77 K/UL (ref 0–0.85)
MONOCYTES NFR BLD AUTO: 13.6 % (ref 0–13.4)
NEUTROPHILS # BLD AUTO: 2.47 K/UL (ref 1.82–7.42)
NEUTROPHILS NFR BLD: 43.6 % (ref 44–72)
NRBC # BLD AUTO: 0 K/UL
NRBC BLD-RTO: 0 /100 WBC (ref 0–0.2)
PLATELET # BLD AUTO: 261 K/UL (ref 164–446)
PMV BLD AUTO: 9.6 FL (ref 9–12.9)
POTASSIUM SERPL-SCNC: 4.1 MMOL/L (ref 3.6–5.5)
PROT SERPL-MCNC: 6.9 G/DL (ref 6–8.2)
RBC # BLD AUTO: 4.34 M/UL (ref 4.2–5.4)
SODIUM SERPL-SCNC: 139 MMOL/L (ref 135–145)
TRIGL SERPL-MCNC: 33 MG/DL (ref 0–149)
WBC # BLD AUTO: 5.7 K/UL (ref 4.8–10.8)

## 2023-10-16 PROCEDURE — 83036 HEMOGLOBIN GLYCOSYLATED A1C: CPT

## 2023-10-16 PROCEDURE — 3078F DIAST BP <80 MM HG: CPT | Performed by: FAMILY MEDICINE

## 2023-10-16 PROCEDURE — 36415 COLL VENOUS BLD VENIPUNCTURE: CPT

## 2023-10-16 PROCEDURE — 80061 LIPID PANEL: CPT

## 2023-10-16 PROCEDURE — 85025 COMPLETE CBC W/AUTO DIFF WBC: CPT

## 2023-10-16 PROCEDURE — 3074F SYST BP LT 130 MM HG: CPT | Performed by: FAMILY MEDICINE

## 2023-10-16 PROCEDURE — 99395 PREV VISIT EST AGE 18-39: CPT | Performed by: FAMILY MEDICINE

## 2023-10-16 PROCEDURE — 80053 COMPREHEN METABOLIC PANEL: CPT

## 2023-10-16 RX ORDER — TRAZODONE HYDROCHLORIDE 50 MG/1
50 TABLET ORAL NIGHTLY
Qty: 90 TABLET | Refills: 1 | Status: SHIPPED | OUTPATIENT
Start: 2023-10-16

## 2023-10-16 RX ORDER — ALPRAZOLAM 0.5 MG/1
0.5 TABLET ORAL NIGHTLY PRN
Qty: 30 TABLET | Refills: 0 | Status: SHIPPED | OUTPATIENT
Start: 2023-10-16 | End: 2023-11-15

## 2023-10-16 RX ORDER — ACETAMINOPHEN 160 MG
TABLET,DISINTEGRATING ORAL
COMMUNITY

## 2023-10-16 ASSESSMENT — FIBROSIS 4 INDEX: FIB4 SCORE: 0.47

## 2023-10-17 NOTE — PROGRESS NOTES
Subjective:   CC: Annual physical    HPI:     Stacia Lindsey is a 33 y.o. female, established patient of the clinic.     Patient has chronic depression with anxiety.  Symptoms are currently controlled with citalopram 20 mg daily.  Patient is also taking trazodone as needed for sleep.  No side effect reported with trazodone.  She was previously found to have mild hyperlipidemia.  Recent labs showed improvement of this condition.  Patient is active and try to exercise regularly.  Patient was previously diagnosed with PCOS by OB/GYN.  Patient is working with fertility specialist for fertility problems.  Patient is doing well.  She has no acute concerns today.    Current medicines (including changes today)  Current Outpatient Medications   Medication Sig Dispense Refill    Cholecalciferol (VITAMIN D3) 2000 UNIT Cap Take  by mouth.      B Complex Vitamins (B COMPLEX 1 PO) Take  by mouth.      MAGNESIUM PO Take  by mouth.      ALPRAZolam (XANAX) 0.5 MG Tab Take 1 Tablet by mouth at bedtime as needed for Sleep for up to 30 days. 30 Tablet 0    traZODone (DESYREL) 50 MG Tab Take 1 Tablet by mouth every evening. 90 Tablet 1    citalopram (CELEXA) 20 MG Tab Take 1 Tablet by mouth every day. 90 Tablet 1     No current facility-administered medications for this visit.     She  has a past medical history of Anxiety, Depression, Lactose intolerance, and Pure hypercholesterolemia (2/6/2023).    She has no past medical history of Addisons disease ([facility]), Adrenal disorder ([facility]), Allergy, Anemia, Arrhythmia, Arthritis, Asthma, Blood transfusion without reported diagnosis, Cancer ([facility]), Cataract, CHF (congestive heart failure) ([facility]), Clotting disorder ([facility]), COPD (chronic obstructive pulmonary disease) ([facility]), Cushings syndrome ([facility]), Diabetes ([facility]), Diabetic neuropathy ([facility]), GERD (gastroesophageal reflux disease), Glaucoma, Goiter, Head ache, Heart attack ([facility]), Heart murmur, HIV (human immunodeficiency virus infection) ([facility]),  "Hypertension, IBD (inflammatory bowel disease), Kidney disease, Meningitis, Migraine, Muscle disorder, Osteoporosis, Parathyroid disorder ([facility]), Pituitary disease ([facility]), Pulmonary emphysema (HCC), Seizure (HCC), Sickle cell disease (HCC), Stroke (HCC), Substance abuse (HCC), Thyroid disease, Tuberculosis, or Urinary tract infection.    I reviewed patient's problem list, allergies, medications, family hx, social hx with patient and update EPIC.        Objective:     /60 (BP Location: Left arm, Patient Position: Sitting, BP Cuff Size: Adult)   Pulse 61   Temp 36.7 °C (98 °F) (Temporal)   Ht 1.727 m (5' 8\")   Wt 59.4 kg (130 lb 15.3 oz)   SpO2 94%  Body mass index is 19.91 kg/m².    Physical Exam:  Constitutional: awake, alert, in no distress.  Skin: Warm, dry, good turgor, no rashes, bruises, ulcers in visible areas.  Eye: conjunctiva clear, lids neg for edema or lesions.  Neck: Trachea midline, no masses, no thyromegaly. No cervical or supraclavicular lymphadenopathy  Respiratory: Unlabored respiratory effort, lungs clear to auscultation, no wheezes, no rales.  Cardiovascular: Normal S1, S2, no murmur, no pedal edema.  Abdomen: Soft, non-tender to palpation, active BS, no hernia, no hepatosplenomegaly, negative rebound or guarding.   Psych: Oriented x3, affect and mood wnl, intact judgement and insight.       Assessment and Plan:   The following treatment plan was discussed    1. Depression with anxiety  Chronic, controlled with citalopram 20 mg daily, no s/e reported, will continue.    Patient also takes Xanax as needed for anxiety flares.  Negative history of illicit drugs or alcohol abuse.  - Continue citalopram 20 mg daily  - ALPRAZolam (XANAX) 0.5 MG Tab; Take 1 Tablet by mouth at bedtime as needed for Sleep for up to 30 days.  Dispense: 30 Tablet; Refill: 0  - Regular exercises    2. PCOS (polycystic ovarian syndrome)  3. Female fertility problem  Follow-up with fertility specialist as directed    4. " Psychophysiological insomnia  Chronic, controlled with trazodone as needed, no s/e reported, will continue.    - traZODone (DESYREL) 50 MG Tab; Take 1 Tablet by mouth every evening.  Dispense: 90 Tablet; Refill: 1    5. Pure hypercholesterolemia  Improved per recent labs.  Continue dietary and lifestyle modification.  We will continue to monitor.    6. PE (physical exam), annual  Labs per orders  Immunization reviewed and discussed.  Patient was counseled about  diet, supplements, exercises.   Preventive cares reviewed, discussed, and updated as appropriate.     - Comp Metabolic Panel; Future  - CBC WITH DIFFERENTIAL; Future  - HEMOGLOBIN A1C; Future  - Lipid Profile; Future   -Patient declined influenza vaccine.    Brisa Teixeira M.D.      Followup: Return in about 1 year (around 10/16/2024) for annual PE.    Please note that this dictation was created using voice recognition software. I have made every reasonable attempt to correct obvious errors, but I expect that there are errors of grammar and possibly content that I did not discover before finalizing the note.

## 2023-10-21 PROBLEM — E78.00 PURE HYPERCHOLESTEROLEMIA: Status: RESOLVED | Noted: 2023-02-06 | Resolved: 2023-10-21

## 2023-10-24 ENCOUNTER — OFFICE VISIT (OUTPATIENT)
Dept: URGENT CARE | Facility: CLINIC | Age: 33
End: 2023-10-24
Payer: COMMERCIAL

## 2023-10-24 VITALS
TEMPERATURE: 98 F | BODY MASS INDEX: 19.71 KG/M2 | OXYGEN SATURATION: 95 % | HEIGHT: 68 IN | WEIGHT: 130.07 LBS | DIASTOLIC BLOOD PRESSURE: 56 MMHG | RESPIRATION RATE: 16 BRPM | SYSTOLIC BLOOD PRESSURE: 102 MMHG | HEART RATE: 72 BPM

## 2023-10-24 DIAGNOSIS — R19.7 DIARRHEA, UNSPECIFIED TYPE: ICD-10-CM

## 2023-10-24 LAB
APPEARANCE UR: CLEAR
BILIRUB UR STRIP-MCNC: NORMAL MG/DL
COLOR UR AUTO: NORMAL
GLUCOSE UR STRIP.AUTO-MCNC: NORMAL MG/DL
KETONES UR STRIP.AUTO-MCNC: NORMAL MG/DL
LEUKOCYTE ESTERASE UR QL STRIP.AUTO: NORMAL
NITRITE UR QL STRIP.AUTO: NORMAL
PH UR STRIP.AUTO: 7.5 [PH] (ref 5–8)
POCT INT CON NEG: NEGATIVE
POCT INT CON POS: POSITIVE
POCT URINE PREGNANCY TEST: NEGATIVE
PROT UR QL STRIP: NORMAL MG/DL
RBC UR QL AUTO: NORMAL
SP GR UR STRIP.AUTO: 1.01
UROBILINOGEN UR STRIP-MCNC: 0.2 MG/DL

## 2023-10-24 PROCEDURE — 81025 URINE PREGNANCY TEST: CPT | Performed by: PHYSICIAN ASSISTANT

## 2023-10-24 PROCEDURE — 3078F DIAST BP <80 MM HG: CPT | Performed by: PHYSICIAN ASSISTANT

## 2023-10-24 PROCEDURE — 3074F SYST BP LT 130 MM HG: CPT | Performed by: PHYSICIAN ASSISTANT

## 2023-10-24 PROCEDURE — 81002 URINALYSIS NONAUTO W/O SCOPE: CPT | Performed by: PHYSICIAN ASSISTANT

## 2023-10-24 PROCEDURE — 99213 OFFICE O/P EST LOW 20 MIN: CPT | Performed by: PHYSICIAN ASSISTANT

## 2023-10-24 RX ORDER — LORATADINE 10 MG/1
10 TABLET ORAL DAILY
COMMUNITY

## 2023-10-24 ASSESSMENT — FIBROSIS 4 INDEX: FIB4 SCORE: 0.59

## 2023-10-24 ASSESSMENT — ENCOUNTER SYMPTOMS: DIARRHEA: 1

## 2023-10-24 NOTE — PROGRESS NOTES
"subjective:     Stacia Lindsey is a 33 y.o. female who presents for Diarrhea (Watery diarrhea: Loratadine/Pepto helps, nauseous x 1 week, suspects Sashimi: 5 Hrs. later was nauseous: relief with OTC medications otherwise constant )     This is a very pleasant 33-year-old female who presents with history of watery nonbloody diarrhea x one week  1-4x/day.   Had a party last Tuesday, had raw fish that day, is unaware of anyone else became sick after the party  That night awoke nauseated, next day with diarrhea, watery, green  Pepto bismol helped slightly, gets worse when pepto wears off  Tried loperamide, then symptoms returned  No fevers/chills  Mild abdominal pain after defecating only  Frequent burping  No recent abx usage, camping or drinking from stream water.  No foreign travel.  No pregnancy concerns  Eating ok, still with appetite  Drinking fluids, stay reasonably well-hydrated  Voiding          Review of Systems   Gastrointestinal:  Positive for diarrhea.       Medications:  ALPRAZolam Tabs  B COMPLEX 1 PO  citalopram Tabs  loratadine Tabs  MAGNESIUM PO  PEPTO-BISMOL PO  traZODone Tabs  Vitamin D3 Caps    Allergies:             Penicillins    Surgical History:       No past surgical history on file.    Past Social Hx:  Stacia Lindsey  reports that she has never smoked. She has never used smokeless tobacco. She reports that she does not currently use alcohol. She reports that she does not use drugs.     Past Family Hx:   Stacia Lindsey family history includes Cancer (age of onset: 45) in her mother; Hypertension in her father.       Problem list, medications, and allergies reviewed by myself today in Epic.     Objective:     /56 (BP Location: Left arm, Patient Position: Sitting, BP Cuff Size: Adult)   Pulse 72   Temp 36.7 °C (98 °F) (Temporal)   Resp 16   Ht 1.727 m (5' 8\")   Wt 59 kg (130 lb 1.1 oz)   SpO2 95%   BMI 19.78 kg/m²     Physical Exam  Vitals and nursing note " reviewed.   Constitutional:       General: She is not in acute distress.     Appearance: Normal appearance. She is not ill-appearing, toxic-appearing or diaphoretic.   HENT:      Nose: Nose normal.      Mouth/Throat:      Mouth: Mucous membranes are moist.      Pharynx: No oropharyngeal exudate or posterior oropharyngeal erythema.   Eyes:      Extraocular Movements: Extraocular movements intact.      Pupils: Pupils are equal, round, and reactive to light.   Cardiovascular:      Rate and Rhythm: Normal rate and regular rhythm.      Pulses: Normal pulses.      Heart sounds: Normal heart sounds.   Pulmonary:      Effort: Pulmonary effort is normal. No tachypnea, accessory muscle usage, prolonged expiration, respiratory distress or retractions.      Breath sounds: Normal breath sounds and air entry. No stridor or decreased air movement. No decreased breath sounds, wheezing, rhonchi or rales.      Comments: Lungs cta b/l  Abdominal:      General: Abdomen is flat. Bowel sounds are normal. There is no distension. There are no signs of injury.      Palpations: Abdomen is soft. There is no splenomegaly, mass or pulsatile mass.      Tenderness: There is no abdominal tenderness. There is no right CVA tenderness, left CVA tenderness, guarding or rebound. Negative signs include Harmon's sign, Rovsing's sign and McBurney's sign.      Hernia: No hernia is present.      Comments: Abdomen soft without guarding or rebound.  Bowel sounds are present all 4 quadrants.  Negative McBurney's.  Negative Harmon's.  Negative Rovsing's   Musculoskeletal:      Cervical back: No rigidity.   Lymphadenopathy:      Cervical: No cervical adenopathy.   Neurological:      Mental Status: She is alert.       Lab Results   Component Value Date/Time    POCCOLOR LIGHT YELLOW 10/24/2023 01:28 PM    POCAPPEAR CLEAR 10/24/2023 01:28 PM    POCLEUKEST NEG 10/24/2023 01:28 PM    POCNITRITE NEG 10/24/2023 01:28 PM    POCUROBILIGE 0.2 10/24/2023 01:28 PM     POCPROTEIN NEG 10/24/2023 01:28 PM    POCURPH 7.5 10/24/2023 01:28 PM    POCBLOOD NEG 10/24/2023 01:28 PM    POCSPGRV 1.010 10/24/2023 01:28 PM    POCKETONES NEG 10/24/2023 01:28 PM    POCBILIRUBIN NEG 10/24/2023 01:28 PM    POCGLUCUA NEG 10/24/2023 01:28 PM      Urine hCG negative  Assessment/Plan:     Diagnosis and Associated Orders:     1. Diarrhea, unspecified type  - POCT Urinalysis  - POCT PREGNANCY  - Ova & Parasite Exam, Fecal; Future  - CULTURE STOOL; Future  - CRYPTO/GIARDIA RAPID ASSAY; Future    Other orders  - loratadine (CLARITIN) 10 MG Tab; Take 10 mg by mouth every day.  - Bismuth Subsalicylate (PEPTO-BISMOL PO); Take  by mouth.        Comments/MDM:  This is a nontoxic-appearing 33-year-old female with 7-day history of diarrhea, possibly related to food ingestion.  No associated vomiting.  No abdominal pain at this time.  No red flags on exam.  Nonbloody, no fevers.  Vital signs stable and reassuring.  She does not appear clinically dehydrated.  Recommend stool studies, stool kit provided.      -Increase fluids.  -Rest.  -Advance diet as tolerated starting with bland, low fat meals. Boiled starches and cereals with salt are indicated in patients with watery diarrhea; crackers, bananas, soup, and boiled vegetables may also be consumed.   -Avoid alcohol, coffee, nicotine and spicy foods.  -Medication as prescribed.  -Probiotics  -Follow up with PCP or clinic in 3 days if not feeling better.  -Advised light diet for next 24 hours then increase as tolerated, push po fluids.  Start pepto bismol 2 caps q 30-60 min, max 16 tab/24 hours.  Advised of darkening of stools and mouth with this product.      -Follow up emergently for more than 6 watery stools in a 24 hour period, blood or mucus in stool, fever greater than 101.2, inability to tolerated fluids, signs of dehydration, weakness, elevated heart rate, increased or persistent abdominal pain.    For patients who want symptomatic therapy, the antimotility  agent loperamide (Imodium) can be used cautiously in patients in whom fever is absent or low grade and the stools are not bloody. The dose of loperamide is two tablets (4 mg) initially, then 2 mg after each unformed stool for ?2 days, with a maximum of 16 mg/day. We avoid antimotility agents in patients with clinical features suggestive of dysentery (fever, bloody or mucoid stools) unless antibiotics are also given because of concerns that antimotility agents can prolong disease in such infections or lead to more severe illness. In such patients, bismuth salicylate (Pepto-Bismol, 30 mL or two tablets every 30 minutes for eight doses) is an acceptable alternative, although it is somewhat less effective and there is the potential for salicylate toxicity (especially in those who take aspirin for any reason and pregnant women).      I personally reviewed prior external notes and test results pertinent to today's visit. Supportive care, natural history, differential diagnoses, and indications for immediate follow-up discussed. Return to clinic or go to ED if symptoms worsen or persist.  Red flag symptoms discussed.  Patient/Parent/Guardian voices understanding. Follow-up with your primary care provider in 3-5 days.  All side effects of medication discussed including allergic response, GI upset, tendon injury, rash, sedation etc    Please note that this dictation was created using voice recognition software. I have made a reasonable attempt to correct obvious errors, but I expect that there are errors of grammar and possibly content that I did not discover before finalizing the note.    This note was electronically signed by Ana Ramirez PA-C

## 2023-10-25 ENCOUNTER — HOSPITAL ENCOUNTER (OUTPATIENT)
Facility: MEDICAL CENTER | Age: 33
End: 2023-10-25
Attending: PHYSICIAN ASSISTANT
Payer: COMMERCIAL

## 2023-10-25 DIAGNOSIS — R19.7 DIARRHEA, UNSPECIFIED TYPE: ICD-10-CM

## 2023-10-25 LAB
G LAMBLIA+C PARVUM AG STL QL RAPID: NORMAL
SIGNIFICANT IND 70042: NORMAL
SITE SITE: NORMAL
SOURCE SOURCE: NORMAL

## 2023-10-25 PROCEDURE — 87045 FECES CULTURE AEROBIC BACT: CPT

## 2023-10-25 PROCEDURE — 87329 GIARDIA AG IA: CPT

## 2023-10-25 PROCEDURE — 87046 STOOL CULTR AEROBIC BACT EA: CPT

## 2023-10-25 PROCEDURE — 87899 AGENT NOS ASSAY W/OPTIC: CPT

## 2023-10-25 PROCEDURE — 87328 CRYPTOSPORIDIUM AG IA: CPT

## 2023-10-26 LAB
E COLI SXT1+2 STL IA: NORMAL
SIGNIFICANT IND 70042: NORMAL
SITE SITE: NORMAL
SOURCE SOURCE: NORMAL

## 2023-10-28 LAB
BACTERIA STL CULT: NORMAL
E COLI SXT1+2 STL IA: NORMAL
SIGNIFICANT IND 70042: NORMAL
SITE SITE: NORMAL
SOURCE SOURCE: NORMAL

## 2023-11-06 ENCOUNTER — HOSPITAL ENCOUNTER (OUTPATIENT)
Dept: LAB | Facility: MEDICAL CENTER | Age: 33
End: 2023-11-06
Attending: OBSTETRICS & GYNECOLOGY
Payer: COMMERCIAL

## 2023-11-06 LAB
ABO GROUP BLD: NORMAL
DHEA-S SERPL-MCNC: 320 UG/DL (ref 98.8–340)
PROLACTIN SERPL-MCNC: 19.8 NG/ML (ref 2.8–26)
RH BLD: NORMAL
RUBV AB SER QL: 89 IU/ML
TSH SERPL DL<=0.005 MIU/L-ACNC: 3.37 UIU/ML (ref 0.38–5.33)

## 2023-11-06 PROCEDURE — 36415 COLL VENOUS BLD VENIPUNCTURE: CPT

## 2023-11-06 PROCEDURE — 84443 ASSAY THYROID STIM HORMONE: CPT

## 2023-11-06 PROCEDURE — 84146 ASSAY OF PROLACTIN: CPT

## 2023-11-06 PROCEDURE — 83520 IMMUNOASSAY QUANT NOS NONAB: CPT

## 2023-11-06 PROCEDURE — 86762 RUBELLA ANTIBODY: CPT

## 2023-11-06 PROCEDURE — 86901 BLOOD TYPING SEROLOGIC RH(D): CPT

## 2023-11-06 PROCEDURE — 86900 BLOOD TYPING SEROLOGIC ABO: CPT

## 2023-11-06 PROCEDURE — 82627 DEHYDROEPIANDROSTERONE: CPT

## 2023-11-06 PROCEDURE — 82951 GLUCOSE TOLERANCE TEST (GTT): CPT

## 2023-11-06 PROCEDURE — 83525 ASSAY OF INSULIN: CPT | Mod: 91

## 2023-11-08 ENCOUNTER — HOSPITAL ENCOUNTER (OUTPATIENT)
Facility: MEDICAL CENTER | Age: 33
End: 2023-11-08
Attending: OBSTETRICS & GYNECOLOGY
Payer: COMMERCIAL

## 2023-11-08 LAB
GLUCOSE 1H P CHAL SERPL-MCNC: 93 MG/DL (ref 65–199)
GLUCOSE 2H P CHAL SERPL-MCNC: 97 MG/DL (ref 65–139)
GLUCOSE BS SERPL-MCNC: 76 MG/DL (ref 65–99)
INSULIN 1H P CHAL SERPL-ACNC: 25 UIU/ML (ref 29–88)
INSULIN 2H P CHAL SERPL-ACNC: 26 UIU/ML (ref 22–79)
INSULIN P FAST SERPL-ACNC: 5 UIU/ML (ref 3–25)

## 2023-11-08 PROCEDURE — 85670 THROMBIN TIME PLASMA: CPT

## 2023-11-09 LAB — MIS SERPL-MCNC: 8.18 NG/ML (ref 0.18–11.71)

## 2023-11-10 LAB — MISCELLANEOUS LAB RESULT MISCLAB: NORMAL

## 2023-12-16 ENCOUNTER — HOSPITAL ENCOUNTER (OUTPATIENT)
Dept: LAB | Facility: MEDICAL CENTER | Age: 33
End: 2023-12-16
Attending: OBSTETRICS & GYNECOLOGY
Payer: COMMERCIAL

## 2023-12-16 LAB
HBV SURFACE AB SERPL IA-ACNC: <3.5 MIU/ML (ref 0–10)
HBV SURFACE AG SER QL: NORMAL
HCV AB SER QL: NORMAL
HIV 1+2 AB+HIV1 P24 AG SERPL QL IA: NORMAL
T PALLIDUM AB SER QL IA: NORMAL

## 2023-12-16 PROCEDURE — 87340 HEPATITIS B SURFACE AG IA: CPT

## 2023-12-16 PROCEDURE — 87491 CHLMYD TRACH DNA AMP PROBE: CPT

## 2023-12-16 PROCEDURE — 86803 HEPATITIS C AB TEST: CPT

## 2023-12-16 PROCEDURE — 36415 COLL VENOUS BLD VENIPUNCTURE: CPT

## 2023-12-16 PROCEDURE — 87389 HIV-1 AG W/HIV-1&-2 AB AG IA: CPT

## 2023-12-16 PROCEDURE — 86706 HEP B SURFACE ANTIBODY: CPT

## 2023-12-16 PROCEDURE — 86780 TREPONEMA PALLIDUM: CPT

## 2023-12-16 PROCEDURE — 87591 N.GONORRHOEAE DNA AMP PROB: CPT

## 2023-12-17 LAB
C TRACH DNA SPEC QL NAA+PROBE: NEGATIVE
N GONORRHOEA DNA SPEC QL NAA+PROBE: NEGATIVE
SPECIMEN SOURCE: NORMAL

## 2024-01-05 ENCOUNTER — HOSPITAL ENCOUNTER (OUTPATIENT)
Dept: LAB | Facility: MEDICAL CENTER | Age: 34
End: 2024-01-05
Attending: OBSTETRICS & GYNECOLOGY
Payer: COMMERCIAL

## 2024-01-05 LAB
B-HCG SERPL-ACNC: <1 MIU/ML (ref 0–5)
TSH SERPL DL<=0.005 MIU/L-ACNC: 3.48 UIU/ML (ref 0.38–5.33)

## 2024-01-05 PROCEDURE — 84702 CHORIONIC GONADOTROPIN TEST: CPT

## 2024-01-05 PROCEDURE — 84443 ASSAY THYROID STIM HORMONE: CPT

## 2024-01-05 PROCEDURE — 36415 COLL VENOUS BLD VENIPUNCTURE: CPT

## 2024-02-02 ENCOUNTER — HOSPITAL ENCOUNTER (OUTPATIENT)
Dept: LAB | Facility: MEDICAL CENTER | Age: 34
End: 2024-02-02
Attending: OBSTETRICS & GYNECOLOGY
Payer: COMMERCIAL

## 2024-02-02 LAB — B-HCG SERPL-ACNC: <1 MIU/ML (ref 0–5)

## 2024-02-02 PROCEDURE — 84702 CHORIONIC GONADOTROPIN TEST: CPT

## 2024-02-02 PROCEDURE — 36415 COLL VENOUS BLD VENIPUNCTURE: CPT

## 2024-02-15 ENCOUNTER — HOSPITAL ENCOUNTER (OUTPATIENT)
Dept: LAB | Facility: MEDICAL CENTER | Age: 34
End: 2024-02-15
Attending: OBSTETRICS & GYNECOLOGY
Payer: COMMERCIAL

## 2024-02-15 LAB — TSH SERPL DL<=0.005 MIU/L-ACNC: 1.41 UIU/ML (ref 0.38–5.33)

## 2024-02-15 PROCEDURE — 36415 COLL VENOUS BLD VENIPUNCTURE: CPT

## 2024-02-15 PROCEDURE — 84443 ASSAY THYROID STIM HORMONE: CPT

## 2024-03-01 ENCOUNTER — HOSPITAL ENCOUNTER (OUTPATIENT)
Dept: LAB | Facility: MEDICAL CENTER | Age: 34
End: 2024-03-01
Attending: OBSTETRICS & GYNECOLOGY
Payer: COMMERCIAL

## 2024-03-01 LAB — B-HCG SERPL-ACNC: <1 MIU/ML (ref 0–5)

## 2024-03-01 PROCEDURE — 84702 CHORIONIC GONADOTROPIN TEST: CPT

## 2024-03-01 PROCEDURE — 36415 COLL VENOUS BLD VENIPUNCTURE: CPT

## 2024-03-29 ENCOUNTER — HOSPITAL ENCOUNTER (OUTPATIENT)
Dept: LAB | Facility: MEDICAL CENTER | Age: 34
End: 2024-03-29
Attending: OBSTETRICS & GYNECOLOGY
Payer: COMMERCIAL

## 2024-03-29 LAB — B-HCG SERPL-ACNC: <1 MIU/ML (ref 0–5)

## 2024-03-29 PROCEDURE — 36415 COLL VENOUS BLD VENIPUNCTURE: CPT

## 2024-03-29 PROCEDURE — 84702 CHORIONIC GONADOTROPIN TEST: CPT

## 2024-08-31 DIAGNOSIS — F41.8 DEPRESSION WITH ANXIETY: ICD-10-CM

## 2024-09-04 RX ORDER — CITALOPRAM HYDROBROMIDE 20 MG/1
20 TABLET ORAL DAILY
Qty: 90 TABLET | Refills: 3 | Status: SHIPPED | OUTPATIENT
Start: 2024-09-04

## 2024-09-20 ENCOUNTER — HOSPITAL ENCOUNTER (OUTPATIENT)
Dept: LAB | Facility: MEDICAL CENTER | Age: 34
End: 2024-09-20
Attending: OBSTETRICS & GYNECOLOGY
Payer: COMMERCIAL

## 2024-09-20 LAB — B-HCG SERPL-ACNC: <1 MIU/ML (ref 0–5)

## 2024-09-20 PROCEDURE — 84702 CHORIONIC GONADOTROPIN TEST: CPT

## 2024-09-20 PROCEDURE — 36415 COLL VENOUS BLD VENIPUNCTURE: CPT

## 2024-10-10 ENCOUNTER — HOSPITAL ENCOUNTER (OUTPATIENT)
Dept: LAB | Facility: MEDICAL CENTER | Age: 34
End: 2024-10-10
Attending: FAMILY MEDICINE
Payer: COMMERCIAL

## 2024-10-10 DIAGNOSIS — Z00.00 PE (PHYSICAL EXAM), ANNUAL: ICD-10-CM

## 2024-10-10 LAB
ALBUMIN SERPL BCP-MCNC: 4.7 G/DL (ref 3.2–4.9)
ALBUMIN/GLOB SERPL: 1.7 G/DL
ALP SERPL-CCNC: 40 U/L (ref 30–99)
ALT SERPL-CCNC: 8 U/L (ref 2–50)
ANION GAP SERPL CALC-SCNC: 10 MMOL/L (ref 7–16)
AST SERPL-CCNC: 16 U/L (ref 12–45)
BASOPHILS # BLD AUTO: 1.3 % (ref 0–1.8)
BASOPHILS # BLD: 0.07 K/UL (ref 0–0.12)
BILIRUB SERPL-MCNC: 0.6 MG/DL (ref 0.1–1.5)
BUN SERPL-MCNC: 19 MG/DL (ref 8–22)
CALCIUM ALBUM COR SERPL-MCNC: 9.2 MG/DL (ref 8.5–10.5)
CALCIUM SERPL-MCNC: 9.8 MG/DL (ref 8.5–10.5)
CHLORIDE SERPL-SCNC: 103 MMOL/L (ref 96–112)
CHOLEST SERPL-MCNC: 182 MG/DL (ref 100–199)
CO2 SERPL-SCNC: 25 MMOL/L (ref 20–33)
CREAT SERPL-MCNC: 0.88 MG/DL (ref 0.5–1.4)
EOSINOPHIL # BLD AUTO: 0.11 K/UL (ref 0–0.51)
EOSINOPHIL NFR BLD: 2.1 % (ref 0–6.9)
ERYTHROCYTE [DISTWIDTH] IN BLOOD BY AUTOMATED COUNT: 44.9 FL (ref 35.9–50)
EST. AVERAGE GLUCOSE BLD GHB EST-MCNC: 97 MG/DL
GFR SERPLBLD CREATININE-BSD FMLA CKD-EPI: 89 ML/MIN/1.73 M 2
GLOBULIN SER CALC-MCNC: 2.7 G/DL (ref 1.9–3.5)
GLUCOSE SERPL-MCNC: 81 MG/DL (ref 65–99)
HBA1C MFR BLD: 5 % (ref 4–5.6)
HCT VFR BLD AUTO: 42 % (ref 37–47)
HDLC SERPL-MCNC: 80 MG/DL
HGB BLD-MCNC: 13.5 G/DL (ref 12–16)
IMM GRANULOCYTES # BLD AUTO: 0.01 K/UL (ref 0–0.11)
IMM GRANULOCYTES NFR BLD AUTO: 0.2 % (ref 0–0.9)
LDLC SERPL CALC-MCNC: 95 MG/DL
LYMPHOCYTES # BLD AUTO: 2.31 K/UL (ref 1–4.8)
LYMPHOCYTES NFR BLD: 43.8 % (ref 22–41)
MCH RBC QN AUTO: 30.3 PG (ref 27–33)
MCHC RBC AUTO-ENTMCNC: 32.1 G/DL (ref 32.2–35.5)
MCV RBC AUTO: 94.4 FL (ref 81.4–97.8)
MONOCYTES # BLD AUTO: 0.7 K/UL (ref 0–0.85)
MONOCYTES NFR BLD AUTO: 13.3 % (ref 0–13.4)
NEUTROPHILS # BLD AUTO: 2.07 K/UL (ref 1.82–7.42)
NEUTROPHILS NFR BLD: 39.3 % (ref 44–72)
NRBC # BLD AUTO: 0 K/UL
NRBC BLD-RTO: 0 /100 WBC (ref 0–0.2)
PLATELET # BLD AUTO: 216 K/UL (ref 164–446)
PMV BLD AUTO: 9.8 FL (ref 9–12.9)
POTASSIUM SERPL-SCNC: 4.4 MMOL/L (ref 3.6–5.5)
PROT SERPL-MCNC: 7.4 G/DL (ref 6–8.2)
RBC # BLD AUTO: 4.45 M/UL (ref 4.2–5.4)
SODIUM SERPL-SCNC: 138 MMOL/L (ref 135–145)
TRIGL SERPL-MCNC: 34 MG/DL (ref 0–149)
WBC # BLD AUTO: 5.3 K/UL (ref 4.8–10.8)

## 2024-10-10 PROCEDURE — 80061 LIPID PANEL: CPT

## 2024-10-10 PROCEDURE — 80053 COMPREHEN METABOLIC PANEL: CPT

## 2024-10-10 PROCEDURE — 36415 COLL VENOUS BLD VENIPUNCTURE: CPT

## 2024-10-10 PROCEDURE — 83036 HEMOGLOBIN GLYCOSYLATED A1C: CPT

## 2024-10-10 PROCEDURE — 85025 COMPLETE CBC W/AUTO DIFF WBC: CPT

## 2024-10-17 ENCOUNTER — OFFICE VISIT (OUTPATIENT)
Dept: MEDICAL GROUP | Facility: MEDICAL CENTER | Age: 34
End: 2024-10-17
Payer: COMMERCIAL

## 2024-10-17 ENCOUNTER — APPOINTMENT (OUTPATIENT)
Dept: MEDICAL GROUP | Facility: MEDICAL CENTER | Age: 34
End: 2024-10-17
Payer: COMMERCIAL

## 2024-10-17 VITALS
OXYGEN SATURATION: 98 % | DIASTOLIC BLOOD PRESSURE: 56 MMHG | WEIGHT: 117.95 LBS | BODY MASS INDEX: 17.88 KG/M2 | TEMPERATURE: 98.6 F | HEIGHT: 68 IN | SYSTOLIC BLOOD PRESSURE: 92 MMHG | HEART RATE: 64 BPM

## 2024-10-17 DIAGNOSIS — Z28.20 VACCINE REFUSED BY PATIENT: ICD-10-CM

## 2024-10-17 DIAGNOSIS — F51.04 PSYCHOPHYSIOLOGICAL INSOMNIA: ICD-10-CM

## 2024-10-17 DIAGNOSIS — E28.2 PCOS (POLYCYSTIC OVARIAN SYNDROME): ICD-10-CM

## 2024-10-17 DIAGNOSIS — Z00.00 PE (PHYSICAL EXAM), ANNUAL: ICD-10-CM

## 2024-10-17 DIAGNOSIS — N97.9 FEMALE FERTILITY PROBLEM: ICD-10-CM

## 2024-10-17 DIAGNOSIS — K90.41 NCGS (NON-CELIAC GLUTEN SENSITIVITY): ICD-10-CM

## 2024-10-17 DIAGNOSIS — F41.8 DEPRESSION WITH ANXIETY: ICD-10-CM

## 2024-10-17 PROCEDURE — 99395 PREV VISIT EST AGE 18-39: CPT | Performed by: FAMILY MEDICINE

## 2024-10-17 PROCEDURE — 99213 OFFICE O/P EST LOW 20 MIN: CPT | Mod: 25 | Performed by: FAMILY MEDICINE

## 2024-10-17 PROCEDURE — 3078F DIAST BP <80 MM HG: CPT | Performed by: FAMILY MEDICINE

## 2024-10-17 PROCEDURE — 3074F SYST BP LT 130 MM HG: CPT | Performed by: FAMILY MEDICINE

## 2024-10-17 RX ORDER — ALPRAZOLAM 0.5 MG
0.5 TABLET ORAL NIGHTLY PRN
Qty: 30 TABLET | Refills: 0 | Status: SHIPPED | OUTPATIENT
Start: 2024-10-17 | End: 2024-11-16

## 2024-10-17 RX ORDER — LEVOTHYROXINE SODIUM 50 MCG
50 TABLET ORAL
COMMUNITY
Start: 2024-08-27

## 2024-10-17 RX ORDER — LETROZOLE 2.5 MG/1
TABLET, FILM COATED ORAL
COMMUNITY
Start: 2024-09-27

## 2024-10-17 ASSESSMENT — PATIENT HEALTH QUESTIONNAIRE - PHQ9: CLINICAL INTERPRETATION OF PHQ2 SCORE: 0

## 2024-10-17 ASSESSMENT — FIBROSIS 4 INDEX: FIB4 SCORE: 0.84

## 2024-10-18 ENCOUNTER — HOSPITAL ENCOUNTER (OUTPATIENT)
Dept: LAB | Facility: MEDICAL CENTER | Age: 34
End: 2024-10-18
Attending: OBSTETRICS & GYNECOLOGY
Payer: COMMERCIAL

## 2024-10-18 LAB — B-HCG SERPL-ACNC: <1 MIU/ML (ref 0–5)

## 2024-10-18 PROCEDURE — 84702 CHORIONIC GONADOTROPIN TEST: CPT

## 2024-10-18 PROCEDURE — 36415 COLL VENOUS BLD VENIPUNCTURE: CPT

## 2025-01-20 ENCOUNTER — OFFICE VISIT (OUTPATIENT)
Dept: URGENT CARE | Facility: CLINIC | Age: 35
End: 2025-01-20
Payer: COMMERCIAL

## 2025-01-20 ENCOUNTER — PHARMACY VISIT (OUTPATIENT)
Dept: PHARMACY | Facility: MEDICAL CENTER | Age: 35
End: 2025-01-20
Payer: COMMERCIAL

## 2025-01-20 ENCOUNTER — HOSPITAL ENCOUNTER (OUTPATIENT)
Facility: MEDICAL CENTER | Age: 35
End: 2025-01-20
Attending: STUDENT IN AN ORGANIZED HEALTH CARE EDUCATION/TRAINING PROGRAM
Payer: COMMERCIAL

## 2025-01-20 VITALS
SYSTOLIC BLOOD PRESSURE: 120 MMHG | DIASTOLIC BLOOD PRESSURE: 68 MMHG | RESPIRATION RATE: 16 BRPM | WEIGHT: 120 LBS | BODY MASS INDEX: 18.83 KG/M2 | HEART RATE: 65 BPM | HEIGHT: 67 IN | TEMPERATURE: 97.9 F | OXYGEN SATURATION: 99 %

## 2025-01-20 DIAGNOSIS — N30.01 ACUTE CYSTITIS WITH HEMATURIA: ICD-10-CM

## 2025-01-20 DIAGNOSIS — R39.9 UTI SYMPTOMS: ICD-10-CM

## 2025-01-20 DIAGNOSIS — N30.01 ACUTE CYSTITIS WITH HEMATURIA: Primary | ICD-10-CM

## 2025-01-20 LAB
APPEARANCE UR: CLEAR
BILIRUB UR STRIP-MCNC: NEGATIVE MG/DL
COLOR UR AUTO: YELLOW
GLUCOSE UR STRIP.AUTO-MCNC: NEGATIVE MG/DL
KETONES UR STRIP.AUTO-MCNC: NEGATIVE MG/DL
LEUKOCYTE ESTERASE UR QL STRIP.AUTO: NORMAL
NITRITE UR QL STRIP.AUTO: NORMAL
PH UR STRIP.AUTO: 5.5 [PH] (ref 5–8)
PROT UR QL STRIP: NEGATIVE MG/DL
RBC UR QL AUTO: NORMAL
SP GR UR STRIP.AUTO: 1.01
UROBILINOGEN UR STRIP-MCNC: NORMAL MG/DL

## 2025-01-20 PROCEDURE — 81002 URINALYSIS NONAUTO W/O SCOPE: CPT | Performed by: STUDENT IN AN ORGANIZED HEALTH CARE EDUCATION/TRAINING PROGRAM

## 2025-01-20 PROCEDURE — 3074F SYST BP LT 130 MM HG: CPT | Performed by: STUDENT IN AN ORGANIZED HEALTH CARE EDUCATION/TRAINING PROGRAM

## 2025-01-20 PROCEDURE — 87077 CULTURE AEROBIC IDENTIFY: CPT

## 2025-01-20 PROCEDURE — 3078F DIAST BP <80 MM HG: CPT | Performed by: STUDENT IN AN ORGANIZED HEALTH CARE EDUCATION/TRAINING PROGRAM

## 2025-01-20 PROCEDURE — 87086 URINE CULTURE/COLONY COUNT: CPT

## 2025-01-20 PROCEDURE — 87186 SC STD MICRODIL/AGAR DIL: CPT

## 2025-01-20 PROCEDURE — RXMED WILLOW AMBULATORY MEDICATION CHARGE: Performed by: STUDENT IN AN ORGANIZED HEALTH CARE EDUCATION/TRAINING PROGRAM

## 2025-01-20 PROCEDURE — 99213 OFFICE O/P EST LOW 20 MIN: CPT | Performed by: STUDENT IN AN ORGANIZED HEALTH CARE EDUCATION/TRAINING PROGRAM

## 2025-01-20 RX ORDER — NITROFURANTOIN 25; 75 MG/1; MG/1
100 CAPSULE ORAL EVERY 12 HOURS
Qty: 10 CAPSULE | Refills: 0 | Status: SHIPPED | OUTPATIENT
Start: 2025-01-20 | End: 2025-01-25

## 2025-01-20 ASSESSMENT — ENCOUNTER SYMPTOMS
RESPIRATORY NEGATIVE: 1
CARDIOVASCULAR NEGATIVE: 1
CONSTITUTIONAL NEGATIVE: 1

## 2025-01-20 ASSESSMENT — FIBROSIS 4 INDEX: FIB4 SCORE: 0.84

## 2025-01-21 NOTE — PROGRESS NOTES
"Subjective:   Stacia Lindsey is a 34 y.o. female who presents for UTI (X today)      HPI:  1 day history of UTI-like symptoms burning, urgency, frequency.  Denies any vaginal discharge vaginal pain vaginal itching.  She denies any back pain flank pain denies any fevers or chills.  No significant abdominal pain or tenderness.    Review of Systems   Constitutional: Negative.    HENT: Negative.     Respiratory: Negative.     Cardiovascular: Negative.    Genitourinary:  Positive for dysuria, frequency and urgency.   Skin: Negative.        Medications:    citalopram Tabs  letrozole Tabs  PEPTO-BISMOL PO  Synthroid Tabs  traZODone Tabs  Vitamin D3 Caps    Allergies: Penicillins    Problem List: Stacia Lindsey does not have any pertinent problems on file.    Surgical History:  No past surgical history on file.    Past Social Hx: Stacia Lindsey  reports that she has never smoked. She has never used smokeless tobacco. She reports that she does not currently use alcohol. She reports that she does not use drugs.     Past Family Hx:  Stacia Lindsey family history includes Cancer (age of onset: 45) in her mother; Hypertension in her father.     Problem list, medications, and allergies reviewed by myself today in Epic.     Objective:     /68   Pulse 65   Temp 36.6 °C (97.9 °F) (Temporal)   Resp 16   Ht 1.702 m (5' 7\")   Wt 54.4 kg (120 lb)   SpO2 99%   BMI 18.79 kg/m²     Physical Exam  Constitutional:       Appearance: Normal appearance.   HENT:      Head: Normocephalic and atraumatic.   Cardiovascular:      Rate and Rhythm: Normal rate and regular rhythm.      Pulses: Normal pulses.      Heart sounds: Normal heart sounds.   Pulmonary:      Effort: Pulmonary effort is normal.      Breath sounds: Normal breath sounds.   Abdominal:      Tenderness: There is no right CVA tenderness or left CVA tenderness.   Neurological:      Mental Status: She is alert.         Assessment/Plan: "     Diagnosis and associated orders:     1. Acute cystitis with hematuria  nitrofurantoin (MACROBID) 100 MG Cap    URINE CULTURE(NEW)      2. UTI symptoms  POCT Urinalysis    nitrofurantoin (MACROBID) 100 MG Cap         Comments/MDM:   1. UTI symptoms  2. Acute cystitis with hematuria      Assessment:  The patient presents with symptoms consistent with a urinary tract infection, including dysuria, frequency, urgency No signs or symptoms of systemic involvement or upper urinary tract infection (e.g., fever, flank pain).    Plan:    Diagnostics:    Urinalysis obtained significant for Blood and leuks  Urine culture sent to confirm the organism and antibiotic sensitivity    Treatment:    - nitrofurantoin (MACROBID) 100 MG Cap; Take 1 Capsule by mouth every 12 hours for 5 days.  Dispense: 10 Capsule; Refill: 0  Will make adjustments based on culture results if necessary.    Adjunctive Measures:    Encouraged increased fluid intake to help flush the urinary tract.  Advised the use of over-the-counter phenazopyridine for symptomatic relief if needed (limited to 2-3 days).    Patient Education:    Reviewed the importance of completing the full course of antibiotics.  Discussed preventive measures, including proper hydration, hygiene practices, and urination after sexual activity    Follow-Up:    Patient to follow up if symptoms worsen or do not improve within 48-72 hours.  Advised to return immediately for evaluation if systemic symptoms (e.g., fever, chills, flank pain) develop           Differential diagnosis, natural history, supportive care, and indications for immediate follow-up discussed.    Advised the patient to follow-up with the primary care physician for recheck, reevaluation, and consideration of further management.    Please note that this dictation was created using voice recognition software. I have made a reasonable attempt to correct obvious errors, but I expect that there are errors of grammar and  possibly content that I did not discover before finalizing the note.    Lonny Fofana M.D.

## 2025-01-24 LAB
BACTERIA UR CULT: ABNORMAL
SIGNIFICANT IND 70042: ABNORMAL
SITE SITE: ABNORMAL
SOURCE SOURCE: ABNORMAL

## 2025-05-31 ENCOUNTER — HOSPITAL ENCOUNTER (OUTPATIENT)
Dept: LAB | Facility: MEDICAL CENTER | Age: 35
End: 2025-05-31
Attending: OBSTETRICS & GYNECOLOGY
Payer: COMMERCIAL

## 2025-05-31 LAB
HBV SURFACE AB SERPL IA-ACNC: <3.5 MIU/ML (ref 0–10)
HBV SURFACE AG SER QL: NORMAL
HCV AB SER QL: NORMAL
HIV 1+2 AB+HIV1 P24 AG SERPL QL IA: NORMAL
T PALLIDUM AB SER QL IA: NORMAL
TSH SERPL-ACNC: 1.45 UIU/ML (ref 0.38–5.33)

## 2025-05-31 PROCEDURE — 86706 HEP B SURFACE ANTIBODY: CPT

## 2025-05-31 PROCEDURE — 82166 ASSAY ANTI-MULLERIAN HORM: CPT

## 2025-05-31 PROCEDURE — 87389 HIV-1 AG W/HIV-1&-2 AB AG IA: CPT

## 2025-05-31 PROCEDURE — 87340 HEPATITIS B SURFACE AG IA: CPT

## 2025-05-31 PROCEDURE — 87591 N.GONORRHOEAE DNA AMP PROB: CPT

## 2025-05-31 PROCEDURE — 86803 HEPATITIS C AB TEST: CPT

## 2025-05-31 PROCEDURE — 36415 COLL VENOUS BLD VENIPUNCTURE: CPT

## 2025-05-31 PROCEDURE — 87491 CHLMYD TRACH DNA AMP PROBE: CPT

## 2025-05-31 PROCEDURE — 86780 TREPONEMA PALLIDUM: CPT

## 2025-05-31 PROCEDURE — 84443 ASSAY THYROID STIM HORMONE: CPT

## 2025-06-04 LAB — MIS SERPL-MCNC: 5.95 NG/ML (ref 0.18–11.71)

## 2025-06-26 ENCOUNTER — OFFICE VISIT (OUTPATIENT)
Dept: MEDICAL GROUP | Facility: MEDICAL CENTER | Age: 35
End: 2025-06-26
Payer: COMMERCIAL

## 2025-06-26 VITALS
SYSTOLIC BLOOD PRESSURE: 102 MMHG | DIASTOLIC BLOOD PRESSURE: 66 MMHG | HEART RATE: 63 BPM | TEMPERATURE: 97.1 F | HEIGHT: 67 IN | OXYGEN SATURATION: 97 % | WEIGHT: 118.61 LBS | BODY MASS INDEX: 18.62 KG/M2

## 2025-06-26 DIAGNOSIS — K59.00 ACUTE CONSTIPATION: ICD-10-CM

## 2025-06-26 DIAGNOSIS — F41.8 DEPRESSION WITH ANXIETY: ICD-10-CM

## 2025-06-26 DIAGNOSIS — N97.9 FEMALE FERTILITY PROBLEM: Primary | ICD-10-CM

## 2025-06-26 DIAGNOSIS — K62.5 RECTAL BLEEDING: ICD-10-CM

## 2025-06-26 DIAGNOSIS — F51.04 PSYCHOPHYSIOLOGICAL INSOMNIA: ICD-10-CM

## 2025-06-26 DIAGNOSIS — K64.4 EXTERNAL HEMORRHOID: ICD-10-CM

## 2025-06-26 PROCEDURE — 3074F SYST BP LT 130 MM HG: CPT | Performed by: FAMILY MEDICINE

## 2025-06-26 PROCEDURE — 3078F DIAST BP <80 MM HG: CPT | Performed by: FAMILY MEDICINE

## 2025-06-26 PROCEDURE — 99214 OFFICE O/P EST MOD 30 MIN: CPT | Performed by: FAMILY MEDICINE

## 2025-06-26 RX ORDER — ALPRAZOLAM 0.5 MG
0.5 TABLET ORAL NIGHTLY PRN
Qty: 30 TABLET | Refills: 0 | Status: SHIPPED | OUTPATIENT
Start: 2025-06-26 | End: 2025-07-26

## 2025-06-26 RX ORDER — LEVOTHYROXINE SODIUM 50 UG/1
50 TABLET ORAL
COMMUNITY

## 2025-06-26 RX ORDER — ALPRAZOLAM 0.5 MG
0.5 TABLET ORAL NIGHTLY PRN
Qty: 30 TABLET | Refills: 0 | Status: SHIPPED | OUTPATIENT
Start: 2025-06-26 | End: 2025-06-26 | Stop reason: SDUPTHER

## 2025-06-26 ASSESSMENT — FIBROSIS 4 INDEX: FIB4 SCORE: 0.84

## 2025-06-26 ASSESSMENT — PATIENT HEALTH QUESTIONNAIRE - PHQ9: CLINICAL INTERPRETATION OF PHQ2 SCORE: 0

## 2025-06-26 NOTE — PROGRESS NOTES
Verbal consent was acquired by the patient to use [employer] ambient listening note generation during this visit: YES    CC: Acute rectal bleeding    Assessment & Plan:     1. 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  - Follow-up with fertility specialist as directed    2. Depression with anxiety  Chronic, controlled with citalopram 20 mg daily.  Patient also takes Xanax as needed for acute anxiety.  Negative history of illicit drugs, alcohol, tobacco abuse.  - ALPRAZolam (XANAX) 0.5 MG Tab; Take 1 Tablet by mouth at bedtime as needed for Anxiety for up to 30 days.  Dispense: 30 Tablet; Refill: 0    3. Acute constipation  4. Rectal bleeding  5. External hemorrhoid  History and exam are concerning for acute constipation leading to transient rectal bleeding which has now stopped.  Patient denies any other systemic or GI symptoms.  Exam was notable for noninflamed, nonthrombosed, nonbleeding hemorrhoid.  -Conservative management recommended.  -Dietary modification to avoid persistent constipation.  -Follow-up as needed           Subjective:       HPI:     History of Present Illness  The patient presents for evaluation of anxiety, hemorrhoids, and headaches.    She has a history of chronic diarrhea, which she attributes to gluten intolerance. Recently, she has experienced a shift towards more solid stools with intermittent acute constipation leading to excessive straining.  She has 1 episode of painless mild rectal bleeding a few weeks ago.  Rectal bleeding stopped shortly after that.  Denies any fever, chills, nausea, vomiting abdominal pain, hematochezia, melena since.  Denies personal/family history of GI disorder or pregnancies.  Her weight remains stable.      She is currently on a daily regimen of citalopram and levothyroxine. She takes a quarter tablet of trazodone nightly, which she reports as effective in maintaining sleep throughout the night without any side  "effects. She uses alprazolam as needed for anxiety, particularly during weather changes, and has approximately 10 tablets remaining. She plans to travel in 07/2025 and anticipates potential stressors. She has been advised to strengthen her muscles through exercise by her chiropractor.    Patient and her  are currently working with fertility       Current Medications[1]     Objective:     Exam:  /66 (BP Location: Right arm, Patient Position: Sitting, BP Cuff Size: Adult)   Pulse 63   Temp 36.2 °C (97.1 °F) (Temporal)   Ht 1.702 m (5' 7\")   Wt 53.8 kg (118 lb 9.7 oz)   SpO2 97%   BMI 18.58 kg/m²  Body mass index is 18.58 kg/m².    Constitutional: awake, alert, in no distress.  Skin: Warm, dry, good turgor, no rashes, bruises, ulcers in visible areas.  Eye: conjunctiva clear, lids neg for edema or lesions.  Neck: Trachea midline, no masses, no thyromegaly. No cervical or supraclavicular lymphadenopathy  Respiratory: Unlabored respiratory effort, lungs clear to auscultation, no wheezes, no rales.  Cardiovascular: Normal S1, S2, no murmur, no pedal edema.   Psych: Oriented x3, affect and mood wnl, intact judgement and insight.   Anal exam: small, non-tender, non-inflamed external hemorrhoid noted.     Return in about 6 months (around 12/26/2025) for Multiple issues.    We use [employer] ([employer]-powered program) to document the visit. I also use Dragon (voice recognition software) to dictate part of the note. I have made every reasonable attempt to correct obvious errors, but I expect that there are errors of grammar and possibly content that I did not discover before finalizing the note.                 [1]   Current Outpatient Medications:     levothyroxine (SYNTHROID) 50 MCG Tab, Take 50 mcg by mouth every morning on an empty stomach., Disp: , Rfl:     ALPRAZolam (XANAX) 0.5 MG Tab, Take 1 Tablet by mouth at bedtime as needed for Anxiety for up to 30 days., Disp: 30 Tablet, Rfl: 0    citalopram (CELEXA) 20 MG " Tab, Take 1 Tablet by mouth every day., Disp: 90 Tablet, Rfl: 3    traZODone (DESYREL) 50 MG Tab, Take 1 Tablet by mouth every evening., Disp: 90 Tablet, Rfl: 1

## 2025-06-27 RX ORDER — TRAZODONE HYDROCHLORIDE 50 MG/1
50 TABLET ORAL NIGHTLY
Qty: 90 TABLET | Refills: 0 | Status: SHIPPED | OUTPATIENT
Start: 2025-06-27

## 2025-08-24 DIAGNOSIS — F41.8 DEPRESSION WITH ANXIETY: ICD-10-CM

## 2025-08-26 RX ORDER — CITALOPRAM HYDROBROMIDE 20 MG/1
20 TABLET ORAL DAILY
Qty: 90 TABLET | Refills: 3 | Status: SHIPPED | OUTPATIENT
Start: 2025-08-26